# Patient Record
Sex: MALE | Race: WHITE | NOT HISPANIC OR LATINO | URBAN - METROPOLITAN AREA
[De-identification: names, ages, dates, MRNs, and addresses within clinical notes are randomized per-mention and may not be internally consistent; named-entity substitution may affect disease eponyms.]

---

## 2017-03-05 ENCOUNTER — EMERGENCY (EMERGENCY)
Facility: HOSPITAL | Age: 77
LOS: 1 days | Discharge: OTHER INSTITUTION | End: 2017-03-05
Attending: EMERGENCY MEDICINE | Admitting: EMERGENCY MEDICINE
Payer: MEDICARE

## 2017-03-05 VITALS
HEART RATE: 95 BPM | RESPIRATION RATE: 18 BRPM | OXYGEN SATURATION: 96 % | DIASTOLIC BLOOD PRESSURE: 78 MMHG | WEIGHT: 160.06 LBS | HEIGHT: 66 IN | SYSTOLIC BLOOD PRESSURE: 143 MMHG | TEMPERATURE: 99 F

## 2017-03-05 DIAGNOSIS — I10 ESSENTIAL (PRIMARY) HYPERTENSION: ICD-10-CM

## 2017-03-05 DIAGNOSIS — S30.1XXA CONTUSION OF ABDOMINAL WALL, INITIAL ENCOUNTER: ICD-10-CM

## 2017-03-05 DIAGNOSIS — R29.898 OTHER SYMPTOMS AND SIGNS INVOLVING THE MUSCULOSKELETAL SYSTEM: ICD-10-CM

## 2017-03-05 LAB
ALBUMIN SERPL ELPH-MCNC: 4.1 G/DL — SIGNIFICANT CHANGE UP (ref 3.4–5)
ALP SERPL-CCNC: 43 U/L — SIGNIFICANT CHANGE UP (ref 40–120)
ALT FLD-CCNC: 10 U/L — LOW (ref 12–42)
ANION GAP SERPL CALC-SCNC: 10 MMOL/L — SIGNIFICANT CHANGE UP (ref 9–16)
APPEARANCE UR: CLEAR — SIGNIFICANT CHANGE UP
APTT BLD: 30.8 SEC — SIGNIFICANT CHANGE UP (ref 27.5–36.5)
AST SERPL-CCNC: 21 U/L — SIGNIFICANT CHANGE UP (ref 15–37)
BASOPHILS NFR BLD AUTO: 0.4 % — SIGNIFICANT CHANGE UP (ref 0–2)
BILIRUB SERPL-MCNC: 1.1 MG/DL — SIGNIFICANT CHANGE UP (ref 0.2–1.2)
BILIRUB UR-MCNC: (no result)
BUN SERPL-MCNC: 18 MG/DL — SIGNIFICANT CHANGE UP (ref 7–23)
CALCIUM SERPL-MCNC: 9.3 MG/DL — SIGNIFICANT CHANGE UP (ref 8.5–10.5)
CHLORIDE SERPL-SCNC: 100 MMOL/L — SIGNIFICANT CHANGE UP (ref 96–108)
CK SERPL-CCNC: 170 U/L — SIGNIFICANT CHANGE UP (ref 39–308)
CO2 SERPL-SCNC: 24 MMOL/L — SIGNIFICANT CHANGE UP (ref 22–31)
COLOR SPEC: YELLOW — SIGNIFICANT CHANGE UP
CREAT SERPL-MCNC: 1.18 MG/DL — SIGNIFICANT CHANGE UP (ref 0.5–1.3)
DIFF PNL FLD: NEGATIVE — SIGNIFICANT CHANGE UP
EOSINOPHIL NFR BLD AUTO: 0.1 % — SIGNIFICANT CHANGE UP (ref 0–6)
ETHANOL SERPL-MCNC: <3 MG/DL — SIGNIFICANT CHANGE UP
GLUCOSE SERPL-MCNC: 107 MG/DL — HIGH (ref 70–99)
GLUCOSE UR QL: NEGATIVE — SIGNIFICANT CHANGE UP
HCT VFR BLD CALC: 45.3 % — SIGNIFICANT CHANGE UP (ref 39–50)
HGB BLD-MCNC: 16.3 G/DL — SIGNIFICANT CHANGE UP (ref 13–17)
IMM GRANULOCYTES NFR BLD AUTO: 0.6 % — SIGNIFICANT CHANGE UP (ref 0–1.5)
INR BLD: 1.16 — SIGNIFICANT CHANGE UP (ref 0.88–1.16)
KETONES UR-MCNC: 40 MG/DL
LACTATE SERPL-SCNC: 2.2 MMOL/L — HIGH (ref 0.4–2)
LEUKOCYTE ESTERASE UR-ACNC: NEGATIVE — SIGNIFICANT CHANGE UP
LYMPHOCYTES # BLD AUTO: 11.8 % — LOW (ref 13–44)
MCHC RBC-ENTMCNC: 32.6 PG — SIGNIFICANT CHANGE UP (ref 27–34)
MCHC RBC-ENTMCNC: 36 G/DL — SIGNIFICANT CHANGE UP (ref 32–36)
MCV RBC AUTO: 90.6 FL — SIGNIFICANT CHANGE UP (ref 80–100)
MONOCYTES NFR BLD AUTO: 8.5 % — SIGNIFICANT CHANGE UP (ref 2–14)
NEUTROPHILS NFR BLD AUTO: 78.6 % — HIGH (ref 43–77)
NITRITE UR-MCNC: NEGATIVE — SIGNIFICANT CHANGE UP
PCP SPEC-MCNC: SIGNIFICANT CHANGE UP
PH UR: 7 — SIGNIFICANT CHANGE UP (ref 4–8.1)
PLATELET # BLD AUTO: 202 K/UL — SIGNIFICANT CHANGE UP (ref 150–400)
POTASSIUM SERPL-MCNC: 4.7 MMOL/L — SIGNIFICANT CHANGE UP (ref 3.5–5.3)
POTASSIUM SERPL-SCNC: 4.7 MMOL/L — SIGNIFICANT CHANGE UP (ref 3.5–5.3)
PROT SERPL-MCNC: 7.4 G/DL — SIGNIFICANT CHANGE UP (ref 6.4–8.2)
PROT UR-MCNC: (no result) MG/DL
PROTHROM AB SERPL-ACNC: 12.8 SEC — SIGNIFICANT CHANGE UP (ref 10–13.1)
RBC # BLD: 5 M/UL — SIGNIFICANT CHANGE UP (ref 4.2–5.8)
RBC # FLD: 11.9 % — SIGNIFICANT CHANGE UP (ref 10.3–16.9)
SODIUM SERPL-SCNC: 134 MMOL/L — SIGNIFICANT CHANGE UP (ref 132–145)
SP GR SPEC: 1.02 — SIGNIFICANT CHANGE UP (ref 1–1.03)
UROBILINOGEN FLD QL: 0.2 E.U./DL — SIGNIFICANT CHANGE UP
WBC # BLD: 14.2 K/UL — HIGH (ref 3.8–10.5)
WBC # FLD AUTO: 14.2 K/UL — HIGH (ref 3.8–10.5)

## 2017-03-05 PROCEDURE — 99285 EMERGENCY DEPT VISIT HI MDM: CPT | Mod: 25

## 2017-03-05 PROCEDURE — 70450 CT HEAD/BRAIN W/O DYE: CPT | Mod: 26

## 2017-03-05 PROCEDURE — 93010 ELECTROCARDIOGRAM REPORT: CPT | Mod: 76

## 2017-03-05 RX ORDER — SODIUM CHLORIDE 9 MG/ML
1000 INJECTION INTRAMUSCULAR; INTRAVENOUS; SUBCUTANEOUS ONCE
Qty: 0 | Refills: 0 | Status: COMPLETED | OUTPATIENT
Start: 2017-03-05 | End: 2017-03-05

## 2017-03-05 RX ADMIN — SODIUM CHLORIDE 500 MILLILITER(S): 9 INJECTION INTRAMUSCULAR; INTRAVENOUS; SUBCUTANEOUS at 21:22

## 2017-03-05 NOTE — ED PROVIDER NOTE - OBJECTIVE STATEMENT
76 yom c/o "unable to move body and felt stiff" upon waking up.  pt states went to bed at 2pm, woke up feeling such.  no pain.  denies fall.  reports last EtOH use was yesterday (states 1 glass of vodka w/ orange juice) when he felt shaky.  denies any use today.  very poor historian, unable to give detailed medical history.

## 2017-03-05 NOTE — ED PROVIDER NOTE - MEDICAL DECISION MAKING DETAILS
"stiffness and unable to move", poor historian, in ED able to move all extremities but difficulty getting off of bed, no evidence of trauma, rectal temp 99.8, will check for source of infection, cpk, possible EtOH withdrawal (?), "stiffness and unable to move", poor historian, in ED able to move all extremities but difficulty getting off of bed, no evidence of trauma, rectal temp 99.8, will check for source of infection, cpk, possible EtOH withdrawal (?)

## 2017-03-05 NOTE — ED PROVIDER NOTE - PROGRESS NOTE DETAILS
spoke w/ friend, pt seen at [facility] Friday, for difficulty ambulating, declined admission, ongoing 1 mo of difficulty w/ ambulation, w/ frequent falls, uses a cane, normally patient does not need cane for ambulation.  Neurologist Dr. Reji Avila. ongoing sx x 1 year, low suspicion for acute cord compression, or epidural abscess (no spinal tenderness or fluctuance or erythema) added on sed rate and crp, possible rheumatologic manifestation? d/w [facility] transfer, [facility] medicine admission under Dr. Wadsworth Dr. Cuenca pt's private psychiatrist [phone] called and updates provided

## 2017-03-05 NOTE — ED ADULT TRIAGE NOTE - CHIEF COMPLAINT QUOTE
patient brought in by ambulance from apartment, complaining of "shaking" on waking up, also patient state being unable to move nor walk. As per EMS, patient regularly drinks ETOH and last drink was last night.

## 2017-03-05 NOTE — ED PROVIDER NOTE - PHYSICAL EXAMINATION
CON: awake, alert, disheveled, HENMT: clear oropharynx, soft neck, minimal tongue fasciculation, HEAD: atraumatic, CV: rrr, equal pulses b/l, RESP: cta b/l, GI: +BS, soft, nontender, no rebound, no guarding, SKIN: no rash, MSK: no edema noted, NEURO: moving all extremities spontaneously, strength 5/5 in all extremities, follows commands CON: awake, alert, disheveled, HENMT: clear oropharynx, soft neck, minimal tongue fasciculation, HEAD: atraumatic, CV: rrr, equal pulses b/l, RESP: cta b/l, GI: +BS, soft, nontender, no rebound, no guarding, SKIN: no rash, MSK: no edema noted, no spinal tenderness/fluctuance/erythema, L flank ecchymosis noted, NEURO: moving all extremities spontaneously, strength 5/5 in all extremities, follows commands

## 2017-03-06 VITALS
DIASTOLIC BLOOD PRESSURE: 89 MMHG | SYSTOLIC BLOOD PRESSURE: 181 MMHG | HEART RATE: 88 BPM | RESPIRATION RATE: 18 BRPM | OXYGEN SATURATION: 96 % | TEMPERATURE: 99 F

## 2017-03-06 LAB — CRP SERPL-MCNC: 1.14 MG/DL — HIGH (ref 0–0.4)

## 2017-03-06 RX ORDER — AMLODIPINE BESYLATE 2.5 MG/1
10 TABLET ORAL ONCE
Qty: 0 | Refills: 0 | Status: COMPLETED | OUTPATIENT
Start: 2017-03-06 | End: 2017-03-06

## 2017-03-06 RX ORDER — SODIUM CHLORIDE 9 MG/ML
1000 INJECTION INTRAMUSCULAR; INTRAVENOUS; SUBCUTANEOUS
Qty: 0 | Refills: 0 | Status: DISCONTINUED | OUTPATIENT
Start: 2017-03-06 | End: 2017-03-09

## 2017-03-06 RX ORDER — LABETALOL HCL 100 MG
10 TABLET ORAL ONCE
Qty: 0 | Refills: 0 | Status: COMPLETED | OUTPATIENT
Start: 2017-03-06 | End: 2017-03-06

## 2017-03-06 RX ADMIN — Medication 10 MILLIGRAM(S): at 03:40

## 2017-03-06 RX ADMIN — AMLODIPINE BESYLATE 10 MILLIGRAM(S): 2.5 TABLET ORAL at 03:40

## 2017-03-06 RX ADMIN — Medication 50 MILLIGRAM(S): at 17:10

## 2017-03-06 RX ADMIN — SODIUM CHLORIDE 60 MILLILITER(S): 9 INJECTION INTRAMUSCULAR; INTRAVENOUS; SUBCUTANEOUS at 19:41

## 2017-03-06 RX ADMIN — SODIUM CHLORIDE 60 MILLILITER(S): 9 INJECTION INTRAMUSCULAR; INTRAVENOUS; SUBCUTANEOUS at 12:34

## 2017-03-06 RX ADMIN — SODIUM CHLORIDE 60 MILLILITER(S): 9 INJECTION INTRAMUSCULAR; INTRAVENOUS; SUBCUTANEOUS at 08:27

## 2017-03-06 NOTE — ED ADULT NURSE REASSESSMENT NOTE - NS ED NURSE REASSESS COMMENT FT1
deep tissue injury noted to left lower back; MD smith notified; skin nonblanchable; pt denies pain to area; denies fall
as of 0700 called [facility] center and no beds available at this time

## 2017-03-07 LAB — ERYTHROCYTE [SEDIMENTATION RATE] IN BLOOD: 6 MM/HR — SIGNIFICANT CHANGE UP (ref 0–20)

## 2017-07-19 ENCOUNTER — EMERGENCY (EMERGENCY)
Facility: HOSPITAL | Age: 77
LOS: 1 days | Discharge: HOME | End: 2017-07-19
Attending: EMERGENCY MEDICINE | Admitting: PSYCHIATRY & NEUROLOGY
Payer: MEDICARE

## 2017-07-19 VITALS
WEIGHT: 149.91 LBS | SYSTOLIC BLOOD PRESSURE: 152 MMHG | OXYGEN SATURATION: 95 % | HEART RATE: 62 BPM | DIASTOLIC BLOOD PRESSURE: 81 MMHG | TEMPERATURE: 99 F | RESPIRATION RATE: 16 BRPM

## 2017-07-19 VITALS
HEART RATE: 70 BPM | TEMPERATURE: 98 F | DIASTOLIC BLOOD PRESSURE: 78 MMHG | SYSTOLIC BLOOD PRESSURE: 145 MMHG | RESPIRATION RATE: 16 BRPM | OXYGEN SATURATION: 96 %

## 2017-07-19 DIAGNOSIS — S63.283A DISLOCATION OF PROXIMAL INTERPHALANGEAL JOINT OF LEFT MIDDLE FINGER, INITIAL ENCOUNTER: ICD-10-CM

## 2017-07-19 DIAGNOSIS — Z79.891 LONG TERM (CURRENT) USE OF OPIATE ANALGESIC: ICD-10-CM

## 2017-07-19 DIAGNOSIS — F17.200 NICOTINE DEPENDENCE, UNSPECIFIED, UNCOMPLICATED: ICD-10-CM

## 2017-07-19 DIAGNOSIS — I10 ESSENTIAL (PRIMARY) HYPERTENSION: ICD-10-CM

## 2017-07-19 PROCEDURE — 73130 X-RAY EXAM OF HAND: CPT | Mod: 26,LT

## 2017-07-19 PROCEDURE — 99284 EMERGENCY DEPT VISIT MOD MDM: CPT | Mod: 25

## 2017-07-19 PROCEDURE — 26770 TREAT FINGER DISLOCATION: CPT | Mod: 54,LT

## 2017-07-19 RX ORDER — ACETAMINOPHEN 500 MG
975 TABLET ORAL ONCE
Qty: 0 | Refills: 0 | Status: COMPLETED | OUTPATIENT
Start: 2017-07-19 | End: 2017-07-19

## 2017-07-19 RX ADMIN — Medication 975 MILLIGRAM(S): at 21:13

## 2017-07-19 NOTE — ED PROVIDER NOTE - SKIN, MLM
Skin normal color for race, warm, dry and intact. Extensive stains from cigs on hands and face. Sa bleeding at 4rd fingertip at tip (abrasiona0 and chipped nail. Skin normal color for race, warm, dry and intact. Extensive stains from cigs on hands and face. Sa bleeding at 5th fingertip at tip (abrasiona0 and chipped nail.

## 2017-07-19 NOTE — ED PROVIDER NOTE - OBJECTIVE STATEMENT
77 M here with [facility] fall- uses cane at baseline. Broke or dislocated L 3rd digit. No other complaints. No other injuries. Didn't take anything for it. Accompanied by his partner.

## 2017-07-19 NOTE — ED PROVIDER NOTE - CARE PLAN
Principal Discharge DX:	Finger injury, left, initial encounter Principal Discharge DX:	Finger injury, left, initial encounter  Secondary Diagnosis:	Finger dislocation, initial encounter

## 2017-07-19 NOTE — ED PROVIDER NOTE - ENMT, MLM
Airway patent, Nasal mucosa clear. MMM, Fac stained from smoking cigs. Airway patent, Nasal mucosa clear. MMM, Face stained from smoking cigs.

## 2017-07-19 NOTE — ED PROVIDER NOTE - MUSCULOSKELETAL, MLM
Spine appears normal, range of motion is not limited, + L 3rd digit angulated ulnarly at DIP. n/v intact Spine appears normal, range of motion is not limited, + L 4th digit angulated ulnarly at DIP. n/v intact

## 2017-07-19 NOTE — ED PROVIDER NOTE - DIAGNOSTIC INTERPRETATION
Interpreted by ED Physician:  Hand xray (3 view)- no fx, no jt effusion, no soft tissue swelling, + dislocation of 4th digit at PIP

## 2017-07-19 NOTE — ED ADULT TRIAGE NOTE - CHIEF COMPLAINT QUOTE
Pt reports trip and fall on the sidewalk while walking. Deformity noted to left 4th finger. Denies any other injuries.

## 2017-07-20 NOTE — ED PROCEDURE NOTE - CPROC ED TIME OUT STATEMENT1
“Patient's name, , procedure and correct site were confirmed during the [place] Timeout.”
“Patient's name, , procedure and correct site were confirmed during the [place] Timeout.”

## 2017-07-20 NOTE — ED PROCEDURE NOTE - CPROC ED POST PROC CARE GUIDE1
Verbal/written post procedure instructions were given to patient/caregiver./Instructed patient/caregiver regarding signs and symptoms of infection./Instructed patient/caregiver to follow-up with primary care physician.
Keep the cast/splint/dressing clean and dry./Elevate the injured extremity as instructed./Verbal/written post procedure instructions were given to patient/caregiver./Instructed patient/caregiver to follow-up with primary care physician.

## 2018-01-25 NOTE — ED ADULT TRIAGE NOTE - TEMPERATURE IN FAHRENHEIT (DEGREES F)
Short Stay Endoscopy History and Physical    PCP - Nancy Pugh MD    Procedure - Colonoscopy  ASA - 2  Mallampati - per anesthesia  History of Anesthesia problems - no  Family history Anesthesia problems -  no     HPI:  This is a 52 y.o. male here for evaluation of :   Active Hospital Problems    Diagnosis  POA    *Screening for colon cancer [Z12.11]  Not Applicable    Special screening for malignant neoplasms, colon [Z12.11]  Not Applicable      Resolved Hospital Problems    Diagnosis Date Resolved POA   No resolved problems to display.         Health Maintenance       Date Due Completion Date    Colonoscopy 09/08/2014 ---    Lipid Panel 01/06/2019 1/6/2018    TETANUS VACCINE 07/21/2026 7/21/2016          Screening - yes  History of polyps - no  Diarrhea - no  Anemia - no  Blood in stools - no  Abdominal pain - no  Other - no    ROS:  CONSTITUTIONAL: Denies weight change,  fatigue, fevers, chills, night sweats.  CARDIOVASCULAR: Denies chest pain, shortness of breath, orthopnea and edema.  RESPIRATORY: Denies cough, hemoptysis, dyspnea, and wheezing.  GI: See HPI.    Medical History:   Past Medical History:   Diagnosis Date    Hyperlipidemia     Hypertension        Surgical History:   History reviewed. No pertinent surgical history.    Family History:   Family History   Problem Relation Age of Onset    Heart disease Father     Heart attacks under age 50 Father     Diabetes Son 12    Diabetes Paternal Grandmother        Social History:   Social History   Substance Use Topics    Smoking status: Former Smoker     Packs/day: 0.75     Years: 13.00     Quit date: 7/24/2015    Smokeless tobacco: Never Used    Alcohol use No       Allergies:   Review of patient's allergies indicates:  No Known Allergies    Medications:   No current facility-administered medications on file prior to encounter.      Current Outpatient Prescriptions on File Prior to Encounter   Medication Sig Dispense Refill    fluticasone  (FLONASE) 50 mcg/actuation nasal spray USE TWO SPRAY(S) IN EACH NOSTRIL ONCE DAILY 16 g 11    hydroCHLOROthiazide (MICROZIDE) 12.5 mg capsule Take 1 capsule (12.5 mg total) by mouth once daily. 90 capsule 0    metoprolol succinate (TOPROL-XL) 100 MG 24 hr tablet Take 1 tablet (100 mg total) by mouth once daily. 90 tablet 0    pravastatin (PRAVACHOL) 20 MG tablet Take 1 tablet (20 mg total) by mouth once daily. 90 tablet 3    loratadine (CLARITIN) 10 mg tablet Take 1 tablet (10 mg total) by mouth once daily.  0    omega-3 fatty acids-vitamin E (FISH OIL) 1,000 mg Cap Take 1 capsule by mouth 2 (two) times daily.  0       Physical Exam:  Vital Signs:   Vitals:    01/25/18 0738   BP: 137/67   Pulse: 94   Resp: 20   Temp: 98.6 °F (37 °C)     General Appearance: Well appearing in no acute distress  ENT: OP clear  Chest: CTA B  CV: RRR, no m/r/g  Abd: s/nt/nd/nabs  Ext: no edema    Labs:Reviewed    IMP:  Active Hospital Problems    Diagnosis  POA    *Screening for colon cancer [Z12.11]  Not Applicable    Special screening for malignant neoplasms, colon [Z12.11]  Not Applicable      Resolved Hospital Problems    Diagnosis Date Resolved POA   No resolved problems to display.         Plan:   I have explained the risks and benefits of colonoscopy to the patient including but not limited to bleeding, perforation, infection, and death. The patient wishes to proceed.     98.9
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
## 2018-10-19 ENCOUNTER — INPATIENT (INPATIENT)
Facility: HOSPITAL | Age: 78
LOS: 6 days | Discharge: HOME | DRG: 682 | End: 2018-10-26
Attending: INTERNAL MEDICINE | Admitting: INTERNAL MEDICINE
Payer: MEDICARE

## 2018-10-19 VITALS
OXYGEN SATURATION: 97 % | TEMPERATURE: 98 F | HEART RATE: 61 BPM | DIASTOLIC BLOOD PRESSURE: 63 MMHG | RESPIRATION RATE: 18 BRPM | SYSTOLIC BLOOD PRESSURE: 123 MMHG

## 2018-10-19 LAB
ALBUMIN SERPL ELPH-MCNC: 3.7 G/DL — SIGNIFICANT CHANGE UP (ref 3.4–5)
ALBUMIN SERPL ELPH-MCNC: 4 G/DL — SIGNIFICANT CHANGE UP (ref 3.4–5)
ALP SERPL-CCNC: 77 U/L — SIGNIFICANT CHANGE UP (ref 40–120)
ALP SERPL-CCNC: 88 U/L — SIGNIFICANT CHANGE UP (ref 40–120)
ALT FLD-CCNC: 13 U/L — SIGNIFICANT CHANGE UP (ref 12–42)
ALT FLD-CCNC: 15 U/L — SIGNIFICANT CHANGE UP (ref 12–42)
ANION GAP SERPL CALC-SCNC: 16 MMOL/L — SIGNIFICANT CHANGE UP (ref 9–16)
ANION GAP SERPL CALC-SCNC: 17 MMOL/L — HIGH (ref 9–16)
APAP SERPL-MCNC: <2 UG/ML — LOW (ref 10–30)
APPEARANCE UR: CLEAR — SIGNIFICANT CHANGE UP
APTT BLD: 37.5 SEC — HIGH (ref 27.5–36.5)
AST SERPL-CCNC: 26 U/L — SIGNIFICANT CHANGE UP (ref 15–37)
AST SERPL-CCNC: 27 U/L — SIGNIFICANT CHANGE UP (ref 15–37)
BASOPHILS NFR BLD AUTO: 0.4 % — SIGNIFICANT CHANGE UP (ref 0–2)
BASOPHILS NFR BLD AUTO: 0.6 % — SIGNIFICANT CHANGE UP (ref 0–2)
BILIRUB SERPL-MCNC: 0.6 MG/DL — SIGNIFICANT CHANGE UP (ref 0.2–1.2)
BILIRUB SERPL-MCNC: 0.6 MG/DL — SIGNIFICANT CHANGE UP (ref 0.2–1.2)
BILIRUB UR-MCNC: ABNORMAL
BUN SERPL-MCNC: 41 MG/DL — HIGH (ref 7–23)
BUN SERPL-MCNC: 41 MG/DL — HIGH (ref 7–23)
CALCIUM SERPL-MCNC: 8.6 MG/DL — SIGNIFICANT CHANGE UP (ref 8.5–10.5)
CALCIUM SERPL-MCNC: 8.8 MG/DL — SIGNIFICANT CHANGE UP (ref 8.5–10.5)
CHLORIDE SERPL-SCNC: 96 MMOL/L — SIGNIFICANT CHANGE UP (ref 96–108)
CHLORIDE SERPL-SCNC: 99 MMOL/L — SIGNIFICANT CHANGE UP (ref 96–108)
CO2 SERPL-SCNC: 16 MMOL/L — LOW (ref 22–31)
CO2 SERPL-SCNC: 17 MMOL/L — LOW (ref 22–31)
COLOR SPEC: YELLOW — SIGNIFICANT CHANGE UP
CREAT SERPL-MCNC: 2.53 MG/DL — HIGH (ref 0.5–1.3)
CREAT SERPL-MCNC: 2.82 MG/DL — HIGH (ref 0.5–1.3)
DIFF PNL FLD: ABNORMAL
EOSINOPHIL NFR BLD AUTO: 0.2 % — SIGNIFICANT CHANGE UP (ref 0–6)
EOSINOPHIL NFR BLD AUTO: 0.5 % — SIGNIFICANT CHANGE UP (ref 0–6)
ETHANOL SERPL-MCNC: <3 MG/DL — SIGNIFICANT CHANGE UP
GLUCOSE SERPL-MCNC: 111 MG/DL — HIGH (ref 70–99)
GLUCOSE SERPL-MCNC: 156 MG/DL — HIGH (ref 70–99)
GLUCOSE UR QL: NEGATIVE — SIGNIFICANT CHANGE UP
HCT VFR BLD CALC: 43.1 % — SIGNIFICANT CHANGE UP (ref 39–50)
HCT VFR BLD CALC: 45.2 % — SIGNIFICANT CHANGE UP (ref 39–50)
HGB BLD-MCNC: 15.9 G/DL — SIGNIFICANT CHANGE UP (ref 13–17)
HGB BLD-MCNC: 16.9 G/DL — SIGNIFICANT CHANGE UP (ref 13–17)
IMM GRANULOCYTES NFR BLD AUTO: 0.4 % — SIGNIFICANT CHANGE UP (ref 0–1.5)
IMM GRANULOCYTES NFR BLD AUTO: 0.5 % — SIGNIFICANT CHANGE UP (ref 0–1.5)
INR BLD: 1.2 — HIGH (ref 0.88–1.16)
KETONES UR-MCNC: 40 MG/DL
LACTATE SERPL-SCNC: 1 MMOL/L — SIGNIFICANT CHANGE UP (ref 0.4–2)
LACTATE SERPL-SCNC: 2.1 MMOL/L — HIGH (ref 0.4–2)
LEUKOCYTE ESTERASE UR-ACNC: NEGATIVE — SIGNIFICANT CHANGE UP
LYMPHOCYTES # BLD AUTO: 15.6 % — SIGNIFICANT CHANGE UP (ref 13–44)
LYMPHOCYTES # BLD AUTO: 17 % — SIGNIFICANT CHANGE UP (ref 13–44)
MAGNESIUM SERPL-MCNC: 2 MG/DL — SIGNIFICANT CHANGE UP (ref 1.6–2.6)
MCHC RBC-ENTMCNC: 31.9 PG — SIGNIFICANT CHANGE UP (ref 27–34)
MCHC RBC-ENTMCNC: 32.3 PG — SIGNIFICANT CHANGE UP (ref 27–34)
MCHC RBC-ENTMCNC: 36.9 G/DL — HIGH (ref 32–36)
MCHC RBC-ENTMCNC: 37.4 G/DL — HIGH (ref 32–36)
MCV RBC AUTO: 86.4 FL — SIGNIFICANT CHANGE UP (ref 80–100)
MCV RBC AUTO: 86.5 FL — SIGNIFICANT CHANGE UP (ref 80–100)
MONOCYTES NFR BLD AUTO: 7 % — SIGNIFICANT CHANGE UP (ref 2–14)
MONOCYTES NFR BLD AUTO: 7.4 % — SIGNIFICANT CHANGE UP (ref 2–14)
NEUTROPHILS NFR BLD AUTO: 74.9 % — SIGNIFICANT CHANGE UP (ref 43–77)
NEUTROPHILS NFR BLD AUTO: 75.5 % — SIGNIFICANT CHANGE UP (ref 43–77)
NITRITE UR-MCNC: NEGATIVE — SIGNIFICANT CHANGE UP
PCP SPEC-MCNC: SIGNIFICANT CHANGE UP
PH UR: 5.5 — SIGNIFICANT CHANGE UP (ref 5–8)
PHOSPHATE SERPL-MCNC: 3.4 MG/DL — SIGNIFICANT CHANGE UP (ref 2.5–4.5)
PLATELET # BLD AUTO: 221 K/UL — SIGNIFICANT CHANGE UP (ref 150–400)
PLATELET # BLD AUTO: 265 K/UL — SIGNIFICANT CHANGE UP (ref 150–400)
POTASSIUM SERPL-MCNC: 2.2 MMOL/L — CRITICAL LOW (ref 3.5–5.3)
POTASSIUM SERPL-MCNC: 2.6 MMOL/L — CRITICAL LOW (ref 3.5–5.3)
POTASSIUM SERPL-SCNC: 2.2 MMOL/L — CRITICAL LOW (ref 3.5–5.3)
POTASSIUM SERPL-SCNC: 2.6 MMOL/L — CRITICAL LOW (ref 3.5–5.3)
PROT SERPL-MCNC: 7.2 G/DL — SIGNIFICANT CHANGE UP (ref 6.4–8.2)
PROT SERPL-MCNC: 7.9 G/DL — SIGNIFICANT CHANGE UP (ref 6.4–8.2)
PROT UR-MCNC: 30 MG/DL
PROTHROM AB SERPL-ACNC: 13.3 SEC — HIGH (ref 9.8–12.7)
RBC # BLD: 4.98 M/UL — SIGNIFICANT CHANGE UP (ref 4.2–5.8)
RBC # BLD: 5.23 M/UL — SIGNIFICANT CHANGE UP (ref 4.2–5.8)
RBC # FLD: 12.3 % — SIGNIFICANT CHANGE UP (ref 10.3–14.5)
RBC # FLD: 12.4 % — SIGNIFICANT CHANGE UP (ref 10.3–14.5)
SALICYLATES SERPL-MCNC: 5.5 MG/DL — SIGNIFICANT CHANGE UP (ref 2.8–20)
SODIUM SERPL-SCNC: 130 MMOL/L — LOW (ref 132–145)
SODIUM SERPL-SCNC: 131 MMOL/L — LOW (ref 132–145)
SP GR SPEC: 1.02 — SIGNIFICANT CHANGE UP (ref 1–1.03)
TROPONIN I SERPL-MCNC: 0.1 NG/ML — HIGH (ref 0.02–0.06)
TROPONIN I SERPL-MCNC: 0.12 NG/ML — HIGH (ref 0.02–0.06)
TSH SERPL-MCNC: 0.64 UIU/ML — SIGNIFICANT CHANGE UP (ref 0.36–3.74)
UROBILINOGEN FLD QL: 1 E.U./DL — SIGNIFICANT CHANGE UP
WBC # BLD: 12.3 K/UL — HIGH (ref 3.8–10.5)
WBC # BLD: 14.5 K/UL — HIGH (ref 3.8–10.5)
WBC # FLD AUTO: 12.3 K/UL — HIGH (ref 3.8–10.5)
WBC # FLD AUTO: 14.5 K/UL — HIGH (ref 3.8–10.5)

## 2018-10-19 PROCEDURE — 93010 ELECTROCARDIOGRAM REPORT: CPT

## 2018-10-19 PROCEDURE — 99284 EMERGENCY DEPT VISIT MOD MDM: CPT | Mod: 25

## 2018-10-19 PROCEDURE — 70450 CT HEAD/BRAIN W/O DYE: CPT | Mod: 26

## 2018-10-19 PROCEDURE — 71046 X-RAY EXAM CHEST 2 VIEWS: CPT | Mod: 26

## 2018-10-19 RX ORDER — MAGNESIUM SULFATE 500 MG/ML
2 VIAL (ML) INJECTION ONCE
Qty: 0 | Refills: 0 | Status: DISCONTINUED | OUTPATIENT
Start: 2018-10-19 | End: 2018-10-19

## 2018-10-19 RX ORDER — POTASSIUM CHLORIDE 20 MEQ
40 PACKET (EA) ORAL ONCE
Qty: 0 | Refills: 0 | Status: COMPLETED | OUTPATIENT
Start: 2018-10-19 | End: 2018-10-19

## 2018-10-19 RX ORDER — SODIUM CHLORIDE 9 MG/ML
1000 INJECTION INTRAMUSCULAR; INTRAVENOUS; SUBCUTANEOUS ONCE
Qty: 0 | Refills: 0 | Status: COMPLETED | OUTPATIENT
Start: 2018-10-19 | End: 2018-10-19

## 2018-10-19 RX ORDER — POTASSIUM CHLORIDE 20 MEQ
10 PACKET (EA) ORAL ONCE
Qty: 0 | Refills: 0 | Status: COMPLETED | OUTPATIENT
Start: 2018-10-19 | End: 2018-10-19

## 2018-10-19 RX ORDER — ASPIRIN/CALCIUM CARB/MAGNESIUM 324 MG
324 TABLET ORAL ONCE
Qty: 0 | Refills: 0 | Status: COMPLETED | OUTPATIENT
Start: 2018-10-19 | End: 2018-10-19

## 2018-10-19 RX ADMIN — Medication 100 MILLIEQUIVALENT(S): at 23:28

## 2018-10-19 RX ADMIN — Medication 10 MILLIEQUIVALENT(S): at 18:58

## 2018-10-19 RX ADMIN — Medication 100 MILLIEQUIVALENT(S): at 19:02

## 2018-10-19 RX ADMIN — SODIUM CHLORIDE 1000 MILLILITER(S): 9 INJECTION INTRAMUSCULAR; INTRAVENOUS; SUBCUTANEOUS at 18:02

## 2018-10-19 RX ADMIN — Medication 324 MILLIGRAM(S): at 18:04

## 2018-10-19 RX ADMIN — SODIUM CHLORIDE 1000 MILLILITER(S): 9 INJECTION INTRAMUSCULAR; INTRAVENOUS; SUBCUTANEOUS at 23:29

## 2018-10-19 RX ADMIN — Medication 40 MILLIEQUIVALENT(S): at 23:28

## 2018-10-19 RX ADMIN — SODIUM CHLORIDE 1000 MILLILITER(S): 9 INJECTION INTRAMUSCULAR; INTRAVENOUS; SUBCUTANEOUS at 19:02

## 2018-10-19 RX ADMIN — Medication 10 MILLIEQUIVALENT(S): at 20:24

## 2018-10-19 RX ADMIN — Medication 100 MILLIEQUIVALENT(S): at 17:58

## 2018-10-19 RX ADMIN — Medication 40 MILLIEQUIVALENT(S): at 17:58

## 2018-10-19 NOTE — ED PROVIDER NOTE - MEDICAL DECISION MAKING DETAILS
Case was discussed to Dr. Flores ([facility] Intensivist) who recommends [facility]. Case endorsed to Dr. Robbins ([facility] Hospitalist) and [facility], and pt has been accepted to [facility] under Dr. Ventura.

## 2018-10-19 NOTE — ED PROVIDER NOTE - OBJECTIVE STATEMENT
79 y/o M with PMH of HTN, Anxiety and Depression BIBA for worsening weakness x 3 weeks. His accompanying friend states that the patient has become increasingly weak over the past 3 weeks. He states the patient sleeps throughout most of the day and has been less responsive recently. He has also noticed that the patient has a decreased appetite. The patient called him this morning and asked him to bring him to his ophthalmologist appointment and while the patient was being examined he began leaning to one side (unknown laterality) so the Ophthalmologist called 911 and EMS arrived. EMT states that they arrived along with the "[facility] Mobile Stroke Unit" and an acute stroke was ruled out by the team, however the patient was then transported here for further evaluation. The patient is lethargic however responds to verbal stimuli. When asked what brings him here, he states he feels increasingly weak and tired recently but denies having any other associated sx's when asked. He admits ot taking Xanax prior to his Ophthlmology appointment today however denies any other meds or drug use when asked.    Denies fever, chills, headache, dizziness, CP, SOB, palpitations, abdo pain, N/V/D

## 2018-10-19 NOTE — ED ADULT TRIAGE NOTE - CHIEF COMPLAINT QUOTE
Per EMS, patient sent from eye doctor's office due to weakness. Stroke team was called while patient was at the doctor's and stroke code canceled. on arrival, patient is lethargic, not answering questions. Noted pale and dry. Friend with patient. Clinically upgraded and taken to room 7.

## 2018-10-19 NOTE — ED ADULT NURSE NOTE - NSIMPLEMENTINTERV_GEN_ALL_ED
Implemented All Universal Safety Interventions:  [place] to call system. Call bell, personal items and telephone within reach. Instruct patient to call for assistance. Room bathroom lighting operational. Non-slip footwear when patient is off stretcher. Physically safe environment: no spills, clutter or unnecessary equipment. Stretcher in lowest position, wheels locked, appropriate side rails in place.

## 2018-10-19 NOTE — ED ADULT NURSE NOTE - OBJECTIVE STATEMENT
patient 77 YO Male with unknown medical hx c/o weakness for more than 1 day . patient was found by friend. patient does admit to chronic drinking. patient aaox3,

## 2018-10-20 DIAGNOSIS — N17.9 ACUTE KIDNEY FAILURE, UNSPECIFIED: ICD-10-CM

## 2018-10-20 DIAGNOSIS — E87.1 HYPO-OSMOLALITY AND HYPONATREMIA: ICD-10-CM

## 2018-10-20 DIAGNOSIS — F32.9 MAJOR DEPRESSIVE DISORDER, SINGLE EPISODE, UNSPECIFIED: ICD-10-CM

## 2018-10-20 DIAGNOSIS — G92 TOXIC ENCEPHALOPATHY: ICD-10-CM

## 2018-10-20 DIAGNOSIS — I10 ESSENTIAL (PRIMARY) HYPERTENSION: ICD-10-CM

## 2018-10-20 DIAGNOSIS — Z29.9 ENCOUNTER FOR PROPHYLACTIC MEASURES, UNSPECIFIED: ICD-10-CM

## 2018-10-20 DIAGNOSIS — Z91.89 OTHER SPECIFIED PERSONAL RISK FACTORS, NOT ELSEWHERE CLASSIFIED: ICD-10-CM

## 2018-10-20 DIAGNOSIS — R74.8 ABNORMAL LEVELS OF OTHER SERUM ENZYMES: ICD-10-CM

## 2018-10-20 DIAGNOSIS — E87.6 HYPOKALEMIA: ICD-10-CM

## 2018-10-20 DIAGNOSIS — R63.8 OTHER SYMPTOMS AND SIGNS CONCERNING FOOD AND FLUID INTAKE: ICD-10-CM

## 2018-10-20 LAB
-  COAGULASE NEGATIVE STAPHYLOCOCCUS: SIGNIFICANT CHANGE UP
ANION GAP SERPL CALC-SCNC: 15 MMOL/L — SIGNIFICANT CHANGE UP (ref 5–17)
ANION GAP SERPL CALC-SCNC: 17 MMOL/L — SIGNIFICANT CHANGE UP (ref 5–17)
ANION GAP SERPL CALC-SCNC: 17 MMOL/L — SIGNIFICANT CHANGE UP (ref 5–17)
ANION GAP SERPL CALC-SCNC: 18 MMOL/L — HIGH (ref 5–17)
ANION GAP SERPL CALC-SCNC: 19 MMOL/L — HIGH (ref 5–17)
APTT BLD: 29 SEC — SIGNIFICANT CHANGE UP (ref 27.5–37.4)
APTT BLD: 37.6 SEC — HIGH (ref 27.5–37.4)
B-OH-BUTYR SERPL-SCNC: 2.2 MMOL/L — HIGH
BUN SERPL-MCNC: 29 MG/DL — HIGH (ref 7–23)
BUN SERPL-MCNC: 33 MG/DL — HIGH (ref 7–23)
BUN SERPL-MCNC: 35 MG/DL — HIGH (ref 7–23)
BUN SERPL-MCNC: 37 MG/DL — HIGH (ref 7–23)
BUN SERPL-MCNC: 37 MG/DL — HIGH (ref 7–23)
CALCIUM SERPL-MCNC: 8.5 MG/DL — SIGNIFICANT CHANGE UP (ref 8.4–10.5)
CALCIUM SERPL-MCNC: 8.6 MG/DL — SIGNIFICANT CHANGE UP (ref 8.4–10.5)
CALCIUM SERPL-MCNC: 8.6 MG/DL — SIGNIFICANT CHANGE UP (ref 8.4–10.5)
CALCIUM SERPL-MCNC: 8.8 MG/DL — SIGNIFICANT CHANGE UP (ref 8.4–10.5)
CALCIUM SERPL-MCNC: 9 MG/DL — SIGNIFICANT CHANGE UP (ref 8.4–10.5)
CHLORIDE SERPL-SCNC: 101 MMOL/L — SIGNIFICANT CHANGE UP (ref 96–108)
CHLORIDE SERPL-SCNC: 102 MMOL/L — SIGNIFICANT CHANGE UP (ref 96–108)
CHLORIDE SERPL-SCNC: 102 MMOL/L — SIGNIFICANT CHANGE UP (ref 96–108)
CHLORIDE SERPL-SCNC: 95 MMOL/L — LOW (ref 96–108)
CHLORIDE SERPL-SCNC: 99 MMOL/L — SIGNIFICANT CHANGE UP (ref 96–108)
CO2 SERPL-SCNC: 12 MMOL/L — LOW (ref 22–31)
CO2 SERPL-SCNC: 13 MMOL/L — LOW (ref 22–31)
CO2 SERPL-SCNC: 13 MMOL/L — LOW (ref 22–31)
CO2 SERPL-SCNC: 16 MMOL/L — LOW (ref 22–31)
CO2 SERPL-SCNC: 17 MMOL/L — LOW (ref 22–31)
CREAT SERPL-MCNC: 1.63 MG/DL — HIGH (ref 0.5–1.3)
CREAT SERPL-MCNC: 1.74 MG/DL — HIGH (ref 0.5–1.3)
CREAT SERPL-MCNC: 1.89 MG/DL — HIGH (ref 0.5–1.3)
CREAT SERPL-MCNC: 2.13 MG/DL — HIGH (ref 0.5–1.3)
CREAT SERPL-MCNC: 2.17 MG/DL — HIGH (ref 0.5–1.3)
CULTURE RESULTS: NO GROWTH — SIGNIFICANT CHANGE UP
GLUCOSE SERPL-MCNC: 131 MG/DL — HIGH (ref 70–99)
GLUCOSE SERPL-MCNC: 144 MG/DL — HIGH (ref 70–99)
GLUCOSE SERPL-MCNC: 156 MG/DL — HIGH (ref 70–99)
GLUCOSE SERPL-MCNC: 186 MG/DL — HIGH (ref 70–99)
GLUCOSE SERPL-MCNC: 189 MG/DL — HIGH (ref 70–99)
GRAM STN FLD: SIGNIFICANT CHANGE UP
HBA1C BLD-MCNC: 5.2 % — SIGNIFICANT CHANGE UP (ref 4–5.6)
HCT VFR BLD CALC: 38.7 % — LOW (ref 39–50)
HCT VFR BLD CALC: 42.1 % — SIGNIFICANT CHANGE UP (ref 39–50)
HGB BLD-MCNC: 14.4 G/DL — SIGNIFICANT CHANGE UP (ref 13–17)
HGB BLD-MCNC: 15.7 G/DL — SIGNIFICANT CHANGE UP (ref 13–17)
INR BLD: 1.18 — HIGH (ref 0.88–1.16)
INR BLD: 1.2 — HIGH (ref 0.88–1.16)
LACTATE SERPL-SCNC: 1.7 MMOL/L — SIGNIFICANT CHANGE UP (ref 0.5–2)
MAGNESIUM SERPL-MCNC: 1.8 MG/DL — SIGNIFICANT CHANGE UP (ref 1.6–2.6)
MAGNESIUM SERPL-MCNC: 2 MG/DL — SIGNIFICANT CHANGE UP (ref 1.6–2.6)
MAGNESIUM SERPL-MCNC: 2 MG/DL — SIGNIFICANT CHANGE UP (ref 1.6–2.6)
MAGNESIUM SERPL-MCNC: 2.1 MG/DL — SIGNIFICANT CHANGE UP (ref 1.6–2.6)
MAGNESIUM SERPL-MCNC: 2.1 MG/DL — SIGNIFICANT CHANGE UP (ref 1.6–2.6)
MCHC RBC-ENTMCNC: 32 PG — SIGNIFICANT CHANGE UP (ref 27–34)
MCHC RBC-ENTMCNC: 32.1 PG — SIGNIFICANT CHANGE UP (ref 27–34)
MCHC RBC-ENTMCNC: 37.2 G/DL — HIGH (ref 32–36)
MCHC RBC-ENTMCNC: 37.3 G/DL — HIGH (ref 32–36)
MCV RBC AUTO: 85.9 FL — SIGNIFICANT CHANGE UP (ref 80–100)
MCV RBC AUTO: 86.4 FL — SIGNIFICANT CHANGE UP (ref 80–100)
METHOD TYPE: SIGNIFICANT CHANGE UP
OSMOLALITY SERPL: 288 MOSM/KG — SIGNIFICANT CHANGE UP (ref 280–301)
PHOSPHATE SERPL-MCNC: 1.3 MG/DL — LOW (ref 2.5–4.5)
PHOSPHATE SERPL-MCNC: 1.5 MG/DL — LOW (ref 2.5–4.5)
PLATELET # BLD AUTO: 189 K/UL — SIGNIFICANT CHANGE UP (ref 150–400)
PLATELET # BLD AUTO: 207 K/UL — SIGNIFICANT CHANGE UP (ref 150–400)
POTASSIUM SERPL-MCNC: 2.8 MMOL/L — CRITICAL LOW (ref 3.5–5.3)
POTASSIUM SERPL-MCNC: 2.9 MMOL/L — CRITICAL LOW (ref 3.5–5.3)
POTASSIUM SERPL-MCNC: 3.3 MMOL/L — LOW (ref 3.5–5.3)
POTASSIUM SERPL-MCNC: 3.8 MMOL/L — SIGNIFICANT CHANGE UP (ref 3.5–5.3)
POTASSIUM SERPL-MCNC: 3.9 MMOL/L — SIGNIFICANT CHANGE UP (ref 3.5–5.3)
POTASSIUM SERPL-SCNC: 2.8 MMOL/L — CRITICAL LOW (ref 3.5–5.3)
POTASSIUM SERPL-SCNC: 2.9 MMOL/L — CRITICAL LOW (ref 3.5–5.3)
POTASSIUM SERPL-SCNC: 3.3 MMOL/L — LOW (ref 3.5–5.3)
POTASSIUM SERPL-SCNC: 3.8 MMOL/L — SIGNIFICANT CHANGE UP (ref 3.5–5.3)
POTASSIUM SERPL-SCNC: 3.9 MMOL/L — SIGNIFICANT CHANGE UP (ref 3.5–5.3)
PROTHROM AB SERPL-ACNC: 13.1 SEC — HIGH (ref 9.8–12.7)
PROTHROM AB SERPL-ACNC: 13.4 SEC — HIGH (ref 9.8–12.7)
RBC # BLD: 4.48 M/UL — SIGNIFICANT CHANGE UP (ref 4.2–5.8)
RBC # BLD: 4.9 M/UL — SIGNIFICANT CHANGE UP (ref 4.2–5.8)
RBC # FLD: 12.9 % — SIGNIFICANT CHANGE UP (ref 10.3–16.9)
RBC # FLD: 12.9 % — SIGNIFICANT CHANGE UP (ref 10.3–16.9)
SODIUM SERPL-SCNC: 130 MMOL/L — LOW (ref 135–145)
SODIUM SERPL-SCNC: 131 MMOL/L — LOW (ref 135–145)
SODIUM SERPL-SCNC: 131 MMOL/L — LOW (ref 135–145)
SODIUM SERPL-SCNC: 132 MMOL/L — LOW (ref 135–145)
SODIUM SERPL-SCNC: 132 MMOL/L — LOW (ref 135–145)
SPECIMEN SOURCE: SIGNIFICANT CHANGE UP
SPECIMEN SOURCE: SIGNIFICANT CHANGE UP
T4 AB SER-ACNC: 7.4 UG/DL — SIGNIFICANT CHANGE UP (ref 4.6–12)
VIT B12 SERPL-MCNC: 502 PG/ML — SIGNIFICANT CHANGE UP (ref 232–1245)
WBC # BLD: 11 K/UL — HIGH (ref 3.8–10.5)
WBC # BLD: 9.9 K/UL — SIGNIFICANT CHANGE UP (ref 3.8–10.5)
WBC # FLD AUTO: 11 K/UL — HIGH (ref 3.8–10.5)
WBC # FLD AUTO: 9.9 K/UL — SIGNIFICANT CHANGE UP (ref 3.8–10.5)

## 2018-10-20 PROCEDURE — 99223 1ST HOSP IP/OBS HIGH 75: CPT | Mod: GC

## 2018-10-20 RX ORDER — INFLUENZA VIRUS VACCINE 15; 15; 15; 15 UG/.5ML; UG/.5ML; UG/.5ML; UG/.5ML
0.5 SUSPENSION INTRAMUSCULAR ONCE
Qty: 0 | Refills: 0 | Status: COMPLETED | OUTPATIENT
Start: 2018-10-20 | End: 2018-10-20

## 2018-10-20 RX ORDER — MAGNESIUM SULFATE 500 MG/ML
1 VIAL (ML) INJECTION ONCE
Qty: 0 | Refills: 0 | Status: COMPLETED | OUTPATIENT
Start: 2018-10-20 | End: 2018-10-20

## 2018-10-20 RX ORDER — LATANOPROST 0.05 MG/ML
1 SOLUTION/ DROPS OPHTHALMIC; TOPICAL AT BEDTIME
Qty: 0 | Refills: 0 | Status: DISCONTINUED | OUTPATIENT
Start: 2018-10-20 | End: 2018-10-26

## 2018-10-20 RX ORDER — POTASSIUM CHLORIDE 20 MEQ
40 PACKET (EA) ORAL ONCE
Qty: 0 | Refills: 0 | Status: DISCONTINUED | OUTPATIENT
Start: 2018-10-20 | End: 2018-10-20

## 2018-10-20 RX ORDER — TIMOLOL 0.5 %
1 DROPS OPHTHALMIC (EYE)
Qty: 0 | Refills: 0 | Status: DISCONTINUED | OUTPATIENT
Start: 2018-10-20 | End: 2018-10-26

## 2018-10-20 RX ORDER — SENNA PLUS 8.6 MG/1
2 TABLET ORAL AT BEDTIME
Qty: 0 | Refills: 0 | Status: DISCONTINUED | OUTPATIENT
Start: 2018-10-20 | End: 2018-10-20

## 2018-10-20 RX ORDER — SODIUM CHLORIDE 9 MG/ML
1000 INJECTION, SOLUTION INTRAVENOUS
Qty: 0 | Refills: 0 | Status: DISCONTINUED | OUTPATIENT
Start: 2018-10-20 | End: 2018-10-20

## 2018-10-20 RX ORDER — POLYETHYLENE GLYCOL 3350 17 G/17G
17 POWDER, FOR SOLUTION ORAL
Qty: 0 | Refills: 0 | Status: DISCONTINUED | OUTPATIENT
Start: 2018-10-20 | End: 2018-10-20

## 2018-10-20 RX ORDER — POTASSIUM CHLORIDE 20 MEQ
10 PACKET (EA) ORAL
Qty: 0 | Refills: 0 | Status: COMPLETED | OUTPATIENT
Start: 2018-10-20 | End: 2018-10-20

## 2018-10-20 RX ORDER — SIMETHICONE 80 MG/1
80 TABLET, CHEWABLE ORAL
Qty: 0 | Refills: 0 | Status: DISCONTINUED | OUTPATIENT
Start: 2018-10-20 | End: 2018-10-26

## 2018-10-20 RX ORDER — POTASSIUM CHLORIDE 20 MEQ
40 PACKET (EA) ORAL ONCE
Qty: 0 | Refills: 0 | Status: COMPLETED | OUTPATIENT
Start: 2018-10-20 | End: 2018-10-20

## 2018-10-20 RX ORDER — LOPERAMIDE HCL 2 MG
2 TABLET ORAL ONCE
Qty: 0 | Refills: 0 | Status: COMPLETED | OUTPATIENT
Start: 2018-10-20 | End: 2018-10-20

## 2018-10-20 RX ORDER — POTASSIUM PHOSPHATE, MONOBASIC POTASSIUM PHOSPHATE, DIBASIC 236; 224 MG/ML; MG/ML
30 INJECTION, SOLUTION INTRAVENOUS ONCE
Qty: 0 | Refills: 0 | Status: COMPLETED | OUTPATIENT
Start: 2018-10-20 | End: 2018-10-20

## 2018-10-20 RX ORDER — POTASSIUM CHLORIDE 20 MEQ
10 PACKET (EA) ORAL
Qty: 0 | Refills: 0 | Status: DISCONTINUED | OUTPATIENT
Start: 2018-10-20 | End: 2018-10-20

## 2018-10-20 RX ORDER — POTASSIUM CHLORIDE 20 MEQ
40 PACKET (EA) ORAL EVERY 4 HOURS
Qty: 0 | Refills: 0 | Status: DISCONTINUED | OUTPATIENT
Start: 2018-10-20 | End: 2018-10-20

## 2018-10-20 RX ORDER — POTASSIUM CHLORIDE 20 MEQ
20 PACKET (EA) ORAL ONCE
Qty: 0 | Refills: 0 | Status: COMPLETED | OUTPATIENT
Start: 2018-10-20 | End: 2018-10-20

## 2018-10-20 RX ORDER — VANCOMYCIN HCL 1 G
1000 VIAL (EA) INTRAVENOUS EVERY 24 HOURS
Qty: 0 | Refills: 0 | Status: DISCONTINUED | OUTPATIENT
Start: 2018-10-20 | End: 2018-10-21

## 2018-10-20 RX ADMIN — LATANOPROST 1 DROP(S): 0.05 SOLUTION/ DROPS OPHTHALMIC; TOPICAL at 21:34

## 2018-10-20 RX ADMIN — SIMETHICONE 80 MILLIGRAM(S): 80 TABLET, CHEWABLE ORAL at 17:06

## 2018-10-20 RX ADMIN — Medication 100 MILLIEQUIVALENT(S): at 12:35

## 2018-10-20 RX ADMIN — Medication 10 MILLIEQUIVALENT(S): at 00:33

## 2018-10-20 RX ADMIN — SODIUM CHLORIDE 80 MILLILITER(S): 9 INJECTION, SOLUTION INTRAVENOUS at 03:59

## 2018-10-20 RX ADMIN — Medication 2 MILLIGRAM(S): at 17:06

## 2018-10-20 RX ADMIN — POLYETHYLENE GLYCOL 3350 17 GRAM(S): 17 POWDER, FOR SOLUTION ORAL at 08:50

## 2018-10-20 RX ADMIN — SODIUM CHLORIDE 150 MILLILITER(S): 9 INJECTION, SOLUTION INTRAVENOUS at 21:34

## 2018-10-20 RX ADMIN — SODIUM CHLORIDE 80 MILLILITER(S): 9 INJECTION, SOLUTION INTRAVENOUS at 07:24

## 2018-10-20 RX ADMIN — Medication 40 MILLIEQUIVALENT(S): at 03:59

## 2018-10-20 RX ADMIN — SODIUM CHLORIDE 1000 MILLILITER(S): 9 INJECTION INTRAMUSCULAR; INTRAVENOUS; SUBCUTANEOUS at 00:33

## 2018-10-20 RX ADMIN — Medication 100 GRAM(S): at 21:34

## 2018-10-20 RX ADMIN — Medication 100 MILLIEQUIVALENT(S): at 13:37

## 2018-10-20 RX ADMIN — POTASSIUM PHOSPHATE, MONOBASIC POTASSIUM PHOSPHATE, DIBASIC 85 MILLIMOLE(S): 236; 224 INJECTION, SOLUTION INTRAVENOUS at 15:57

## 2018-10-20 RX ADMIN — Medication 40 MILLIEQUIVALENT(S): at 03:58

## 2018-10-20 RX ADMIN — Medication 250 MILLIGRAM(S): at 19:13

## 2018-10-20 RX ADMIN — Medication 1 DROP(S): at 17:06

## 2018-10-20 RX ADMIN — Medication 40 MILLIEQUIVALENT(S): at 11:36

## 2018-10-20 RX ADMIN — Medication 100 MILLIEQUIVALENT(S): at 13:32

## 2018-10-20 RX ADMIN — Medication 20 MILLIEQUIVALENT(S): at 21:34

## 2018-10-20 RX ADMIN — Medication 40 MILLIEQUIVALENT(S): at 13:37

## 2018-10-20 NOTE — H&P ADULT - PROBLEM SELECTOR PLAN 10
1) PCP Contacted on Admission: (N) --> Name & Phone #:   2) Date of Contact with PCP:  3) PCP Contacted at Discharge: (Y/N, N/A)  4) Summary of Handoff Given to PCP:   5) Post-Discharge Appointment Date and Location:
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
## 2018-10-20 NOTE — H&P ADULT - PROBLEM SELECTOR PLAN 2
-K 2.2 initially. persistent hypokalemia. Unclear etiology.  -Received 110 meq prior to arrival  -will give additional 80 meq PO now. K+ 2.8.  -no EKG changes  -f/u urine anion gap  -trend BMP and Mg

## 2018-10-20 NOTE — H&P ADULT - PROBLEM SELECTOR PLAN 3
-Na 130, stable. Unclear etiology. not improved with 2L NS  -f/u urine studies, TSH, serum osm  -trend BMP  -c/w D5NS at 80 for now

## 2018-10-20 NOTE — PHYSICAL THERAPY INITIAL EVALUATION ADULT - LEVEL OF CONSCIOUSNESS, REHAB EVAL
lethargic/somnolent/intermittently delayed responses requiring repeat of questions/commands ~25% of the time

## 2018-10-20 NOTE — PHYSICAL THERAPY INITIAL EVALUATION ADULT - ADDITIONAL COMMENTS
Patient lives alone in an elevator apartment with no steps to enter. Prior to admission, patient was independent for all functional mobility and ADLs without assistive device. Endorses one fall in past 6 months secondary to imbalance

## 2018-10-20 NOTE — PROGRESS NOTE ADULT - PROBLEM SELECTOR PLAN 1
-presenting with cr 2.8. unknown baseline. reports no hx kidney dz.  -unclear etiology. suspect prerenal as improved with IVF  -f/u urine lytes calculate FeNa and urinary anion gap  -strict I/O  -avoid nephrotoxic agents  -trend BMP  -started on ringer's lactate

## 2018-10-20 NOTE — H&P ADULT - PROBLEM SELECTOR PLAN 4
-Trop I peaked at 0.12. No ischemic changes on EKG. Suspect 2/2 to renal failure. Denies CV symptoms.
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
## 2018-10-20 NOTE — H&P ADULT - ASSESSMENT
Pt is a 78M, poor historian, with a PMHx HTN, depression and anxiety with toxic metabolic encephalopathy, acute renal failure, hypokalemia and hyponatremia. JUAN MIGUEL WHITE RN

## 2018-10-20 NOTE — PHYSICAL THERAPY INITIAL EVALUATION ADULT - MANUAL MUSCLE TESTING RESULTS, REHAB EVAL
Strength grossly at least 3/5 based on functional assessment of bed mobility - patient deferred transfers and ambulation due to feeling timid about OOB activities

## 2018-10-20 NOTE — PROGRESS NOTE ADULT - PROBLEM SELECTOR PLAN 2
K 2.2 on admission. persistent hypokalemia. Likely 2/2 diarrhea.  pt initially stated he hasn't had BM in 2-3 days but on further questioning stated he has also had diarrhea. Received 110 meq prior to arrival  -no EKG changes.  -f/u urine anion gap  -trend BMP and Mg

## 2018-10-20 NOTE — H&P ADULT - PROBLEM SELECTOR PLAN 7
SCDs  RMF  full code  no protonix -Denies antidepressant use. Previously on sertraline. Denies SI/HI

## 2018-10-20 NOTE — H&P ADULT - NSHPPHYSICALEXAM_GEN_ALL_CORE
General:  NAD, nontoxic appearing, WDWN, ox3  HENT:  EOMI, PERRL.  No sinus tenderness.  MMM   Neck:  Trachea midline.  No JVD, LAD, or thyromegaly.  Heart:  S1S2 no M/R/G, rrr  Lungs:  CTAB no wheezing, rhonchi or rales.  No accessory muscle use.  No respiratory distress.  Abdomen:  NABS.  soft, nontender, nondistended.  no guarding.  no ascites.  no organomegaly.  Vascular:  Peripheral pulses palpable  Extremities:  trace LE edema b/l  Back:  No CVA tenderness  Neuro:  AOx3, no facial asymmetry, nonfocal, no slurred speech  CN: II-XII grossly intact bl  Strength- 5/5 throughout  sensation- intact to light touch throughout  gait deferred  no ataxia  flat affect  Skin:  No rash

## 2018-10-20 NOTE — PHYSICAL THERAPY INITIAL EVALUATION ADULT - CRITERIA FOR SKILLED THERAPEUTIC INTERVENTIONS
functional limitations in following categories/risk reduction/prevention/therapy frequency/anticipated discharge recommendation/impairments found/rehab potential/anticipated equipment needs at discharge

## 2018-10-20 NOTE — H&P ADULT - PROBLEM SELECTOR PLAN 8
F: D5NS at 79  E: replete PRN  N: DASH, renal diet F:Ringer's lactate at 150cc/h  E: replete PRN  N: DASH, renal diet

## 2018-10-20 NOTE — PROGRESS NOTE ADULT - PROBLEM SELECTOR PLAN 6
-Says he is on an antihypertensive but is unsure what.  -Currently normotensive. obtain collateral in am.    #unintentional weight loss  -unclear etiology. sounds chronic, as had similar admission three years ago but left AMA. BMI WNL. check TSH.

## 2018-10-20 NOTE — PHYSICAL THERAPY INITIAL EVALUATION ADULT - PATIENT/FAMILY/SIGNIFICANT OTHER GOALS STATEMENT, PT EVAL
Patient is nervous to perform out of bed activities and does not want to ambulate today however agreeable to sit up

## 2018-10-20 NOTE — PHYSICAL THERAPY INITIAL EVALUATION ADULT - IMPAIRMENTS FOUND, PT EVAL
aerobic capacity/endurance/arousal, attention, and cognition/muscle strength/gait, locomotion, and balance/posture

## 2018-10-20 NOTE — H&P ADULT - PROBLEM SELECTOR PLAN 6
F: D5NS at 79  E: replete PRN  N: DASH, renal diet -Says he is on an antihypertensive but is unsure what.  -Currently normotensive. obtain collateral in am.    #unintentional weight loss  -unclear etiology. sounds chronic, as had similar admission three years ago but left AMA. BMI WNL. check TSH.

## 2018-10-20 NOTE — PROGRESS NOTE ADULT - SUBJECTIVE AND OBJECTIVE BOX
OVERNIGHT EVENTS:    SUBJECTIVE / INTERVAL HPI: Patient seen and examined at bedside.     VITAL SIGNS:  Vital Signs Last 24 Hrs  T(C): 36.4 (20 Oct 2018 15:55), Max: 36.9 (20 Oct 2018 08:51)  T(F): 97.6 (20 Oct 2018 15:55), Max: 98.5 (20 Oct 2018 08:51)  HR: 87 (20 Oct 2018 16:56) (65 - 87)  BP: 150/81 (20 Oct 2018 16:56) (143/81 - 188/103)  BP(mean): --  RR: 18 (20 Oct 2018 15:55) (16 - 19)  SpO2: 100% (20 Oct 2018 15:55) (97% - 100%)    PHYSICAL EXAM:    General: NAD  HEENT: NCAT; PERRL, anicteric sclera  Neck: supple, trachea midline  Cardiovascular: S1, S2 normal; RRR, no M/G/R  Respiratory: CTABL; no W/R/R  Gastrointestinal: soft, nontender, nondistended. bowel sounds present.  Skin: no ulcerations or visible rashes appreciated  Extremities: WWP; no edema, clubbing or cyanosis  Vascular: 2+ radial, DP/PT pulses B/L  Neurological: AAOx3; no focal deficits    MEDICATIONS:  MEDICATIONS  (STANDING):  influenza   Vaccine 0.5 milliLiter(s) IntraMuscular once  lactated ringers. 1000 milliLiter(s) (150 mL/Hr) IV Continuous <Continuous>  latanoprost 0.005% Ophthalmic Solution 1 Drop(s) Both EYES at bedtime  timolol 0.25% Solution 1 Drop(s) Both EYES two times a day  vancomycin  IVPB 1000 milliGRAM(s) IV Intermittent every 24 hours    MEDICATIONS  (PRN):  simethicone 80 milliGRAM(s) Chew four times a day PRN Gas      ALLERGIES:  Allergies    No Known Allergies    Intolerances        LABS:                        14.4   9.9   )-----------( 189      ( 20 Oct 2018 06:50 )             38.7     10-20    132<L>  |  102  |  33<H>  ----------------------------<  189<H>  3.9   |  13<L>  |  1.74<H>    Ca    9.0      20 Oct 2018 15:43  Phos  1.3     10-20  Mg     2.0     10-20    TPro  7.2  /  Alb  3.7  /  TBili  0.6  /  DBili  x   /  AST  26  /  ALT  15  /  AlkPhos  77  10-19    PT/INR - ( 20 Oct 2018 06:50 )   PT: 13.1 sec;   INR: 1.18          PTT - ( 20 Oct 2018 06:50 )  PTT:29.0 sec  Urinalysis Basic - ( 19 Oct 2018 18:21 )    Color: Yellow / Appearance: Clear / S.025 / pH: x  Gluc: x / Ketone: 40 mg/dL  / Bili: Large / Urobili: 1.0 E.U./dL   Blood: x / Protein: 30 mg/dL / Nitrite: NEGATIVE   Leuk Esterase: NEGATIVE / RBC: x / WBC 5-10 /HPF   Sq Epi: x / Non Sq Epi: 0-5 /HPF / Bacteria: x      CAPILLARY BLOOD GLUCOSE      POCT Blood Glucose.: 149 mg/dL (19 Oct 2018 16:50)      RADIOLOGY & ADDITIONAL TESTS: Reviewed. OVERNIGHT EVENTS: admitted overnight    SUBJECTIVE / INTERVAL HPI: Patient seen and examined at bedside.  Pt states he has not had BM in 2-3 days and feels gassy. Denies fever/chills, CP, SOB, abd pain, n/v, dysuria, hematuria    VITAL SIGNS:  Vital Signs Last 24 Hrs  T(C): 36.4 (20 Oct 2018 15:55), Max: 36.9 (20 Oct 2018 08:51)  T(F): 97.6 (20 Oct 2018 15:55), Max: 98.5 (20 Oct 2018 08:51)  HR: 87 (20 Oct 2018 16:56) (65 - 87)  BP: 150/81 (20 Oct 2018 16:56) (143/81 - 188/103)  BP(mean): --  RR: 18 (20 Oct 2018 15:55) (16 - 19)  SpO2: 100% (20 Oct 2018 15:55) (97% - 100%)    PHYSICAL EXAM:    General: NAD, disheveled, poor hygeine,   HEENT: NCAT; PERRL, anicteric sclera  Neck: supple, trachea midline  Cardiovascular: S1, S2 normal; RRR, no M/G/R  Respiratory: CTABL; no W/R/R  Gastrointestinal: soft, nontender, nondistended. bowel sounds present.  Skin: no ulcerations or visible rashes appreciated  Extremities: WWP; no edema, clubbing or cyanosis  Vascular: 2+ radial, DP/PT pulses B/L  Neurological: AAOx3; no focal deficits    MEDICATIONS:  MEDICATIONS  (STANDING):  influenza   Vaccine 0.5 milliLiter(s) IntraMuscular once  lactated ringers. 1000 milliLiter(s) (150 mL/Hr) IV Continuous <Continuous>  latanoprost 0.005% Ophthalmic Solution 1 Drop(s) Both EYES at bedtime  timolol 0.25% Solution 1 Drop(s) Both EYES two times a day  vancomycin  IVPB 1000 milliGRAM(s) IV Intermittent every 24 hours    MEDICATIONS  (PRN):  simethicone 80 milliGRAM(s) Chew four times a day PRN Gas      ALLERGIES:  Allergies    No Known Allergies    Intolerances        LABS:                        14.4   9.9   )-----------( 189      ( 20 Oct 2018 06:50 )             38.7     10-20    132<L>  |  102  |  33<H>  ----------------------------<  189<H>  3.9   |  13<L>  |  1.74<H>    Ca    9.0      20 Oct 2018 15:43  Phos  1.3     10-20  Mg     2.0     10-    TPro  7.2  /  Alb  3.7  /  TBili  0.6  /  DBili  x   /  AST  26  /  ALT  15  /  AlkPhos  77  10-19    PT/INR - ( 20 Oct 2018 06:50 )   PT: 13.1 sec;   INR: 1.18          PTT - ( 20 Oct 2018 06:50 )  PTT:29.0 sec  Urinalysis Basic - ( 19 Oct 2018 18:21 )    Color: Yellow / Appearance: Clear / S.025 / pH: x  Gluc: x / Ketone: 40 mg/dL  / Bili: Large / Urobili: 1.0 E.U./dL   Blood: x / Protein: 30 mg/dL / Nitrite: NEGATIVE   Leuk Esterase: NEGATIVE / RBC: x / WBC 5-10 /HPF   Sq Epi: x / Non Sq Epi: 0-5 /HPF / Bacteria: x      CAPILLARY BLOOD GLUCOSE      POCT Blood Glucose.: 149 mg/dL (19 Oct 2018 16:50)      RADIOLOGY & ADDITIONAL TESTS: Reviewed.

## 2018-10-20 NOTE — H&P ADULT - PROBLEM SELECTOR PLAN 5
-Patient with delayed responses. oriented x 3 but has difficulty remembering things such as medications, PMHx. Likely 2/2 Xanax vs hyponatremia, dehydration.  -No e/o infection aside from leukocytosis (normal diff, downtrending).   -B12, TSH, RPR  -avoid sedating medications

## 2018-10-20 NOTE — H&P ADULT - PROBLEM SELECTOR PLAN 1
-presenting with cr 2.8. unknown baseline. reports no hx kidney dz.  -unclear etiology. suspect prerenal as improved with IVF  -f/u urine lytes calculate FeNa and urinary anion gap  -strict I/O  -avoid nephrotoxic agents  -trend BMP

## 2018-10-21 LAB
ANION GAP SERPL CALC-SCNC: 18 MMOL/L — HIGH (ref 5–17)
BUN SERPL-MCNC: 22 MG/DL — SIGNIFICANT CHANGE UP (ref 7–23)
CALCIUM SERPL-MCNC: 8.8 MG/DL — SIGNIFICANT CHANGE UP (ref 8.4–10.5)
CHLORIDE SERPL-SCNC: 102 MMOL/L — SIGNIFICANT CHANGE UP (ref 96–108)
CHLORIDE UR-SCNC: 120 MMOL/L — SIGNIFICANT CHANGE UP
CO2 SERPL-SCNC: 13 MMOL/L — LOW (ref 22–31)
CREAT ?TM UR-MCNC: 112 MG/DL — SIGNIFICANT CHANGE UP
CREAT SERPL-MCNC: 1.5 MG/DL — HIGH (ref 0.5–1.3)
CULTURE RESULTS: SIGNIFICANT CHANGE UP
GLUCOSE SERPL-MCNC: 114 MG/DL — HIGH (ref 70–99)
HCT VFR BLD CALC: 35.8 % — LOW (ref 39–50)
HGB BLD-MCNC: 13.3 G/DL — SIGNIFICANT CHANGE UP (ref 13–17)
MAGNESIUM SERPL-MCNC: 2 MG/DL — SIGNIFICANT CHANGE UP (ref 1.6–2.6)
MCHC RBC-ENTMCNC: 31.1 PG — SIGNIFICANT CHANGE UP (ref 27–34)
MCHC RBC-ENTMCNC: 37.2 G/DL — HIGH (ref 32–36)
MCV RBC AUTO: 83.6 FL — SIGNIFICANT CHANGE UP (ref 80–100)
ORGANISM # SPEC MICROSCOPIC CNT: SIGNIFICANT CHANGE UP
ORGANISM # SPEC MICROSCOPIC CNT: SIGNIFICANT CHANGE UP
OSMOLALITY UR: 559 MOSMOL/KG — SIGNIFICANT CHANGE UP (ref 100–650)
PHOSPHATE SERPL-MCNC: 1.6 MG/DL — LOW (ref 2.5–4.5)
PLATELET # BLD AUTO: 214 K/UL — SIGNIFICANT CHANGE UP (ref 150–400)
POTASSIUM SERPL-MCNC: 3.2 MMOL/L — LOW (ref 3.5–5.3)
POTASSIUM SERPL-SCNC: 3.2 MMOL/L — LOW (ref 3.5–5.3)
POTASSIUM UR-SCNC: 38 MMOL/L — SIGNIFICANT CHANGE UP
RBC # BLD: 4.28 M/UL — SIGNIFICANT CHANGE UP (ref 4.2–5.8)
RBC # FLD: 12.4 % — SIGNIFICANT CHANGE UP (ref 10.3–16.9)
SODIUM SERPL-SCNC: 133 MMOL/L — LOW (ref 135–145)
SODIUM UR-SCNC: 73 MMOL/L — SIGNIFICANT CHANGE UP
SPECIMEN SOURCE: SIGNIFICANT CHANGE UP
T PALLIDUM AB TITR SER: NEGATIVE — SIGNIFICANT CHANGE UP
UUN UR-MCNC: 755 MG/DL — SIGNIFICANT CHANGE UP
WBC # BLD: 8.7 K/UL — SIGNIFICANT CHANGE UP (ref 3.8–10.5)
WBC # FLD AUTO: 8.7 K/UL — SIGNIFICANT CHANGE UP (ref 3.8–10.5)

## 2018-10-21 PROCEDURE — 99232 SBSQ HOSP IP/OBS MODERATE 35: CPT | Mod: GC

## 2018-10-21 RX ORDER — SODIUM CHLORIDE 9 MG/ML
1000 INJECTION, SOLUTION INTRAVENOUS
Qty: 0 | Refills: 0 | Status: DISCONTINUED | OUTPATIENT
Start: 2018-10-21 | End: 2018-10-22

## 2018-10-21 RX ORDER — LANOLIN ALCOHOL/MO/W.PET/CERES
5 CREAM (GRAM) TOPICAL AT BEDTIME
Qty: 0 | Refills: 0 | Status: DISCONTINUED | OUTPATIENT
Start: 2018-10-21 | End: 2018-10-26

## 2018-10-21 RX ORDER — HYDRALAZINE HCL 50 MG
10 TABLET ORAL EVERY 8 HOURS
Qty: 0 | Refills: 0 | Status: DISCONTINUED | OUTPATIENT
Start: 2018-10-21 | End: 2018-10-21

## 2018-10-21 RX ORDER — HYDRALAZINE HCL 50 MG
25 TABLET ORAL EVERY 8 HOURS
Qty: 0 | Refills: 0 | Status: DISCONTINUED | OUTPATIENT
Start: 2018-10-22 | End: 2018-10-22

## 2018-10-21 RX ORDER — POTASSIUM CHLORIDE 20 MEQ
20 PACKET (EA) ORAL
Qty: 0 | Refills: 0 | Status: DISCONTINUED | OUTPATIENT
Start: 2018-10-21 | End: 2018-10-21

## 2018-10-21 RX ORDER — MAGNESIUM SULFATE 500 MG/ML
1 VIAL (ML) INJECTION ONCE
Qty: 0 | Refills: 0 | Status: COMPLETED | OUTPATIENT
Start: 2018-10-21 | End: 2018-10-21

## 2018-10-21 RX ORDER — POTASSIUM CHLORIDE 20 MEQ
20 PACKET (EA) ORAL
Qty: 0 | Refills: 0 | Status: COMPLETED | OUTPATIENT
Start: 2018-10-21 | End: 2018-10-21

## 2018-10-21 RX ORDER — AMLODIPINE BESYLATE 2.5 MG/1
5 TABLET ORAL DAILY
Qty: 0 | Refills: 0 | Status: DISCONTINUED | OUTPATIENT
Start: 2018-10-21 | End: 2018-10-21

## 2018-10-21 RX ORDER — POTASSIUM CHLORIDE 20 MEQ
40 PACKET (EA) ORAL ONCE
Qty: 0 | Refills: 0 | Status: DISCONTINUED | OUTPATIENT
Start: 2018-10-21 | End: 2018-10-21

## 2018-10-21 RX ORDER — AMLODIPINE BESYLATE 2.5 MG/1
10 TABLET ORAL DAILY
Qty: 0 | Refills: 0 | Status: DISCONTINUED | OUTPATIENT
Start: 2018-10-21 | End: 2018-10-21

## 2018-10-21 RX ORDER — POTASSIUM CHLORIDE 20 MEQ
20 PACKET (EA) ORAL ONCE
Qty: 0 | Refills: 0 | Status: COMPLETED | OUTPATIENT
Start: 2018-10-21 | End: 2018-10-21

## 2018-10-21 RX ORDER — AMLODIPINE BESYLATE 2.5 MG/1
10 TABLET ORAL DAILY
Qty: 0 | Refills: 0 | Status: DISCONTINUED | OUTPATIENT
Start: 2018-10-22 | End: 2018-10-26

## 2018-10-21 RX ADMIN — SODIUM CHLORIDE 150 MILLILITER(S): 9 INJECTION, SOLUTION INTRAVENOUS at 21:09

## 2018-10-21 RX ADMIN — Medication 20 MILLIEQUIVALENT(S): at 12:11

## 2018-10-21 RX ADMIN — Medication 20 MILLIEQUIVALENT(S): at 14:20

## 2018-10-21 RX ADMIN — Medication 10 MILLIGRAM(S): at 21:09

## 2018-10-21 RX ADMIN — LATANOPROST 1 DROP(S): 0.05 SOLUTION/ DROPS OPHTHALMIC; TOPICAL at 21:09

## 2018-10-21 RX ADMIN — Medication 1 DROP(S): at 06:26

## 2018-10-21 RX ADMIN — Medication 10 MILLIGRAM(S): at 14:20

## 2018-10-21 RX ADMIN — Medication 1 DROP(S): at 17:58

## 2018-10-21 RX ADMIN — Medication 10 MILLIGRAM(S): at 06:02

## 2018-10-21 RX ADMIN — Medication 5 MILLIGRAM(S): at 21:29

## 2018-10-21 RX ADMIN — AMLODIPINE BESYLATE 5 MILLIGRAM(S): 2.5 TABLET ORAL at 10:09

## 2018-10-21 RX ADMIN — SODIUM CHLORIDE 150 MILLILITER(S): 9 INJECTION, SOLUTION INTRAVENOUS at 12:11

## 2018-10-21 RX ADMIN — Medication 20 MILLIEQUIVALENT(S): at 07:42

## 2018-10-21 RX ADMIN — Medication 100 GRAM(S): at 07:42

## 2018-10-21 RX ADMIN — Medication 20 MILLIEQUIVALENT(S): at 14:22

## 2018-10-21 NOTE — PROGRESS NOTE ADULT - SUBJECTIVE AND OBJECTIVE BOX
Patient is a 78y old  Male who presents with a chief complaint of renal failure (20 Oct 2018 19:39)      INTERVAL HPI/OVERNIGHT EVENTS: No acute events O/N. No complaints at this time.  Diarrhea resolved.    Review of Systems: 12 point review of systems otherwise negative      MEDICATIONS  (STANDING):  amLODIPine   Tablet 5 milliGRAM(s) Oral daily  hydrALAZINE 10 milliGRAM(s) Oral every 8 hours  influenza   Vaccine 0.5 milliLiter(s) IntraMuscular once  lactated ringers. 1000 milliLiter(s) (150 mL/Hr) IV Continuous <Continuous>  latanoprost 0.005% Ophthalmic Solution 1 Drop(s) Both EYES at bedtime  timolol 0.25% Solution 1 Drop(s) Both EYES two times a day    MEDICATIONS  (PRN):  simethicone 80 milliGRAM(s) Chew four times a day PRN Gas      Allergies    No Known Allergies    Intolerances          Vital Signs Last 24 Hrs  T(C): 36.9 (21 Oct 2018 08:18), Max: 37 (20 Oct 2018 21:02)  T(F): 98.5 (21 Oct 2018 08:18), Max: 98.6 (20 Oct 2018 21:02)  HR: 67 (21 Oct 2018 08:18) (64 - 87)  BP: 170/79 (21 Oct 2018 08:18) (150/81 - 198/96)  BP(mean): --  RR: 20 (21 Oct 2018 08:18) (18 - 20)  SpO2: 98% (21 Oct 2018 08:18) (97% - 100%)  CAPILLARY BLOOD GLUCOSE          10-20 @ 07:01  -  10- @ 07:00  --------------------------------------------------------  IN: 700 mL / OUT: 0 mL / NET: 700 mL        Physical Exam:    Daily     Daily   General:  NAD  HEENT:  Nonicteric  CV:  RRR, no murmur, no JVD  Lungs:  CTA B/L, no wheezes, rales, rhonchi  Abdomen:  Soft, non-tender  Extremities:  No edema  Skin:  Warm and dry, no rashes  :  No cherry  Neuro:  Nonfocal  No Restraints    LABS:                        13.3   8.7   )-----------( 214      ( 21 Oct 2018 07:24 )             35.8     10-    133<L>  |  102  |  22  ----------------------------<  114<H>  3.2<L>   |  13<L>  |  1.50<H>    Ca    8.8      21 Oct 2018 07:24  Phos  1.6     10-  Mg     2.0     10-    TPro  7.2  /  Alb  3.7  /  TBili  0.6  /  DBili  x   /  AST  26  /  ALT  15  /  AlkPhos  77  10-    PT/INR - ( 20 Oct 2018 06:50 )   PT: 13.1 sec;   INR: 1.18          PTT - ( 20 Oct 2018 06:50 )  PTT:29.0 sec  Urinalysis Basic - ( 19 Oct 2018 18:21 )    Color: Yellow / Appearance: Clear / S.025 / pH: x  Gluc: x / Ketone: 40 mg/dL  / Bili: Large / Urobili: 1.0 E.U./dL   Blood: x / Protein: 30 mg/dL / Nitrite: NEGATIVE   Leuk Esterase: NEGATIVE / RBC: x / WBC 5-10 /HPF   Sq Epi: x / Non Sq Epi: 0-5 /HPF / Bacteria: x

## 2018-10-21 NOTE — PROGRESS NOTE ADULT - PROBLEM SELECTOR PLAN 5
-Started Norvasc    #unintentional weight loss  -unclear etiology. sounds chronic, as had similar admission three years ago but left AMA. BMI WNL. check TSH.

## 2018-10-22 DIAGNOSIS — I16.1 HYPERTENSIVE EMERGENCY: ICD-10-CM

## 2018-10-22 LAB
ALBUMIN SERPL ELPH-MCNC: 4 G/DL — SIGNIFICANT CHANGE UP (ref 3.3–5)
ALP SERPL-CCNC: 71 U/L — SIGNIFICANT CHANGE UP (ref 40–120)
ALT FLD-CCNC: 8 U/L — LOW (ref 10–45)
ANION GAP SERPL CALC-SCNC: 17 MMOL/L — SIGNIFICANT CHANGE UP (ref 5–17)
ANION GAP SERPL CALC-SCNC: 18 MMOL/L — HIGH (ref 5–17)
APTT BLD: 32.3 SEC — SIGNIFICANT CHANGE UP (ref 27.5–37.4)
AST SERPL-CCNC: 16 U/L — SIGNIFICANT CHANGE UP (ref 10–40)
BASE EXCESS BLDV CALC-SCNC: -9.9 MMOL/L — SIGNIFICANT CHANGE UP
BASOPHILS NFR BLD AUTO: 0.6 % — SIGNIFICANT CHANGE UP (ref 0–2)
BILIRUB SERPL-MCNC: 0.7 MG/DL — SIGNIFICANT CHANGE UP (ref 0.2–1.2)
BLD GP AB SCN SERPL QL: NEGATIVE — SIGNIFICANT CHANGE UP
BLD GP AB SCN SERPL QL: NEGATIVE — SIGNIFICANT CHANGE UP
BUN SERPL-MCNC: 11 MG/DL — SIGNIFICANT CHANGE UP (ref 7–23)
BUN SERPL-MCNC: 13 MG/DL — SIGNIFICANT CHANGE UP (ref 7–23)
CALCIUM SERPL-MCNC: 9 MG/DL — SIGNIFICANT CHANGE UP (ref 8.4–10.5)
CALCIUM SERPL-MCNC: 9 MG/DL — SIGNIFICANT CHANGE UP (ref 8.4–10.5)
CHLORIDE SERPL-SCNC: 100 MMOL/L — SIGNIFICANT CHANGE UP (ref 96–108)
CHLORIDE SERPL-SCNC: 101 MMOL/L — SIGNIFICANT CHANGE UP (ref 96–108)
CK MB CFR SERPL CALC: 5.6 NG/ML — SIGNIFICANT CHANGE UP (ref 0–6.7)
CK SERPL-CCNC: 76 U/L — SIGNIFICANT CHANGE UP (ref 30–200)
CO2 SERPL-SCNC: 14 MMOL/L — LOW (ref 22–31)
CO2 SERPL-SCNC: 17 MMOL/L — LOW (ref 22–31)
CREAT SERPL-MCNC: 1.17 MG/DL — SIGNIFICANT CHANGE UP (ref 0.5–1.3)
CREAT SERPL-MCNC: 1.17 MG/DL — SIGNIFICANT CHANGE UP (ref 0.5–1.3)
EOSINOPHIL NFR BLD AUTO: 0.9 % — SIGNIFICANT CHANGE UP (ref 0–6)
GAS PNL BLDV: SIGNIFICANT CHANGE UP
GLUCOSE BLDC GLUCOMTR-MCNC: 134 MG/DL — HIGH (ref 70–99)
GLUCOSE BLDC GLUCOMTR-MCNC: 138 MG/DL — HIGH (ref 70–99)
GLUCOSE BLDC GLUCOMTR-MCNC: 146 MG/DL — HIGH (ref 70–99)
GLUCOSE SERPL-MCNC: 122 MG/DL — HIGH (ref 70–99)
GLUCOSE SERPL-MCNC: 136 MG/DL — HIGH (ref 70–99)
HCO3 BLDV-SCNC: 13 MMOL/L — LOW (ref 20–27)
HCT VFR BLD CALC: 38.7 % — LOW (ref 39–50)
HGB BLD-MCNC: 14.2 G/DL — SIGNIFICANT CHANGE UP (ref 13–17)
INR BLD: 1.19 — HIGH (ref 0.88–1.16)
LACTATE SERPL-SCNC: 2 MMOL/L — SIGNIFICANT CHANGE UP (ref 0.5–2)
LYMPHOCYTES # BLD AUTO: 29.5 % — SIGNIFICANT CHANGE UP (ref 13–44)
MAGNESIUM SERPL-MCNC: 1.8 MG/DL — SIGNIFICANT CHANGE UP (ref 1.6–2.6)
MCHC RBC-ENTMCNC: 31.4 PG — SIGNIFICANT CHANGE UP (ref 27–34)
MCHC RBC-ENTMCNC: 36.7 G/DL — HIGH (ref 32–36)
MCV RBC AUTO: 85.6 FL — SIGNIFICANT CHANGE UP (ref 80–100)
MONOCYTES NFR BLD AUTO: 8 % — SIGNIFICANT CHANGE UP (ref 2–14)
NEUTROPHILS NFR BLD AUTO: 61 % — SIGNIFICANT CHANGE UP (ref 43–77)
PCO2 BLDV: 21 MMHG — LOW (ref 41–51)
PH BLDV: 7.39 — SIGNIFICANT CHANGE UP (ref 7.32–7.43)
PHOSPHATE SERPL-MCNC: 1.4 MG/DL — LOW (ref 2.5–4.5)
PLATELET # BLD AUTO: 209 K/UL — SIGNIFICANT CHANGE UP (ref 150–400)
PO2 BLDV: 82 MMHG — SIGNIFICANT CHANGE UP
POTASSIUM SERPL-MCNC: 3.6 MMOL/L — SIGNIFICANT CHANGE UP (ref 3.5–5.3)
POTASSIUM SERPL-MCNC: 3.7 MMOL/L — SIGNIFICANT CHANGE UP (ref 3.5–5.3)
POTASSIUM SERPL-SCNC: 3.6 MMOL/L — SIGNIFICANT CHANGE UP (ref 3.5–5.3)
POTASSIUM SERPL-SCNC: 3.7 MMOL/L — SIGNIFICANT CHANGE UP (ref 3.5–5.3)
PROT SERPL-MCNC: 7 G/DL — SIGNIFICANT CHANGE UP (ref 6–8.3)
PROTHROM AB SERPL-ACNC: 13.2 SEC — HIGH (ref 9.8–12.7)
RBC # BLD: 4.52 M/UL — SIGNIFICANT CHANGE UP (ref 4.2–5.8)
RBC # FLD: 13.2 % — SIGNIFICANT CHANGE UP (ref 10.3–16.9)
RH IG SCN BLD-IMP: POSITIVE — SIGNIFICANT CHANGE UP
RH IG SCN BLD-IMP: POSITIVE — SIGNIFICANT CHANGE UP
SAO2 % BLDV: 96 % — SIGNIFICANT CHANGE UP
SODIUM SERPL-SCNC: 133 MMOL/L — LOW (ref 135–145)
SODIUM SERPL-SCNC: 134 MMOL/L — LOW (ref 135–145)
TROPONIN T SERPL-MCNC: <0.01 NG/ML — SIGNIFICANT CHANGE UP (ref 0–0.01)
WBC # BLD: 9.1 K/UL — SIGNIFICANT CHANGE UP (ref 3.8–10.5)
WBC # FLD AUTO: 9.1 K/UL — SIGNIFICANT CHANGE UP (ref 3.8–10.5)

## 2018-10-22 PROCEDURE — 99223 1ST HOSP IP/OBS HIGH 75: CPT

## 2018-10-22 PROCEDURE — 99231 SBSQ HOSP IP/OBS SF/LOW 25: CPT | Mod: GC

## 2018-10-22 PROCEDURE — 99233 SBSQ HOSP IP/OBS HIGH 50: CPT | Mod: GC

## 2018-10-22 PROCEDURE — 70450 CT HEAD/BRAIN W/O DYE: CPT | Mod: 26,59

## 2018-10-22 PROCEDURE — 70496 CT ANGIOGRAPHY HEAD: CPT | Mod: 26

## 2018-10-22 PROCEDURE — 70498 CT ANGIOGRAPHY NECK: CPT | Mod: 26

## 2018-10-22 PROCEDURE — 0042T: CPT

## 2018-10-22 RX ORDER — ASPIRIN/CALCIUM CARB/MAGNESIUM 324 MG
81 TABLET ORAL DAILY
Qty: 0 | Refills: 0 | Status: DISCONTINUED | OUTPATIENT
Start: 2018-10-22 | End: 2018-10-26

## 2018-10-22 RX ORDER — POTASSIUM CHLORIDE 20 MEQ
20 PACKET (EA) ORAL ONCE
Qty: 0 | Refills: 0 | Status: COMPLETED | OUTPATIENT
Start: 2018-10-22 | End: 2018-10-22

## 2018-10-22 RX ORDER — CLOPIDOGREL BISULFATE 75 MG/1
75 TABLET, FILM COATED ORAL DAILY
Qty: 0 | Refills: 0 | Status: DISCONTINUED | OUTPATIENT
Start: 2018-10-22 | End: 2018-10-26

## 2018-10-22 RX ORDER — LOSARTAN POTASSIUM 100 MG/1
100 TABLET, FILM COATED ORAL DAILY
Qty: 0 | Refills: 0 | Status: DISCONTINUED | OUTPATIENT
Start: 2018-10-22 | End: 2018-10-26

## 2018-10-22 RX ORDER — ATORVASTATIN CALCIUM 80 MG/1
80 TABLET, FILM COATED ORAL AT BEDTIME
Qty: 0 | Refills: 0 | Status: DISCONTINUED | OUTPATIENT
Start: 2018-10-22 | End: 2018-10-26

## 2018-10-22 RX ORDER — LABETALOL HCL 100 MG
10 TABLET ORAL ONCE
Qty: 0 | Refills: 0 | Status: DISCONTINUED | OUTPATIENT
Start: 2018-10-22 | End: 2018-10-22

## 2018-10-22 RX ORDER — MAGNESIUM SULFATE 500 MG/ML
1 VIAL (ML) INJECTION ONCE
Qty: 0 | Refills: 0 | Status: COMPLETED | OUTPATIENT
Start: 2018-10-22 | End: 2018-10-22

## 2018-10-22 RX ORDER — LOSARTAN POTASSIUM 100 MG/1
100 TABLET, FILM COATED ORAL DAILY
Qty: 0 | Refills: 0 | Status: DISCONTINUED | OUTPATIENT
Start: 2018-10-22 | End: 2018-10-22

## 2018-10-22 RX ORDER — LABETALOL HCL 100 MG
5 TABLET ORAL ONCE
Qty: 0 | Refills: 0 | Status: COMPLETED | OUTPATIENT
Start: 2018-10-22 | End: 2018-10-22

## 2018-10-22 RX ORDER — HALOPERIDOL DECANOATE 100 MG/ML
2 INJECTION INTRAMUSCULAR ONCE
Qty: 0 | Refills: 0 | Status: COMPLETED | OUTPATIENT
Start: 2018-10-22 | End: 2018-10-22

## 2018-10-22 RX ORDER — HEPARIN SODIUM 5000 [USP'U]/ML
5000 INJECTION INTRAVENOUS; SUBCUTANEOUS EVERY 8 HOURS
Qty: 0 | Refills: 0 | Status: DISCONTINUED | OUTPATIENT
Start: 2018-10-22 | End: 2018-10-26

## 2018-10-22 RX ORDER — POTASSIUM PHOSPHATE, MONOBASIC POTASSIUM PHOSPHATE, DIBASIC 236; 224 MG/ML; MG/ML
30 INJECTION, SOLUTION INTRAVENOUS ONCE
Qty: 0 | Refills: 0 | Status: COMPLETED | OUTPATIENT
Start: 2018-10-22 | End: 2018-10-22

## 2018-10-22 RX ORDER — LABETALOL HCL 100 MG
100 TABLET ORAL
Qty: 0 | Refills: 0 | Status: DISCONTINUED | OUTPATIENT
Start: 2018-10-22 | End: 2018-10-23

## 2018-10-22 RX ADMIN — Medication 20 MILLIEQUIVALENT(S): at 09:08

## 2018-10-22 RX ADMIN — Medication 81 MILLIGRAM(S): at 19:11

## 2018-10-22 RX ADMIN — Medication 5 MILLIGRAM(S): at 02:08

## 2018-10-22 RX ADMIN — Medication 5 MILLIGRAM(S): at 22:11

## 2018-10-22 RX ADMIN — Medication 100 MILLIGRAM(S): at 19:11

## 2018-10-22 RX ADMIN — HEPARIN SODIUM 5000 UNIT(S): 5000 INJECTION INTRAVENOUS; SUBCUTANEOUS at 22:11

## 2018-10-22 RX ADMIN — Medication 1 DROP(S): at 06:36

## 2018-10-22 RX ADMIN — CLOPIDOGREL BISULFATE 75 MILLIGRAM(S): 75 TABLET, FILM COATED ORAL at 19:11

## 2018-10-22 RX ADMIN — HALOPERIDOL DECANOATE 2 MILLIGRAM(S): 100 INJECTION INTRAMUSCULAR at 01:58

## 2018-10-22 RX ADMIN — Medication 25 MILLIGRAM(S): at 06:37

## 2018-10-22 RX ADMIN — Medication 100 GRAM(S): at 09:08

## 2018-10-22 RX ADMIN — LOSARTAN POTASSIUM 100 MILLIGRAM(S): 100 TABLET, FILM COATED ORAL at 09:08

## 2018-10-22 RX ADMIN — ATORVASTATIN CALCIUM 80 MILLIGRAM(S): 80 TABLET, FILM COATED ORAL at 22:11

## 2018-10-22 RX ADMIN — Medication 1 DROP(S): at 19:11

## 2018-10-22 RX ADMIN — Medication 25 MILLIGRAM(S): at 15:02

## 2018-10-22 RX ADMIN — AMLODIPINE BESYLATE 10 MILLIGRAM(S): 2.5 TABLET ORAL at 06:37

## 2018-10-22 RX ADMIN — Medication 100 MILLIGRAM(S): at 12:20

## 2018-10-22 RX ADMIN — POTASSIUM PHOSPHATE, MONOBASIC POTASSIUM PHOSPHATE, DIBASIC 85 MILLIMOLE(S): 236; 224 INJECTION, SOLUTION INTRAVENOUS at 09:07

## 2018-10-22 RX ADMIN — LATANOPROST 1 DROP(S): 0.05 SOLUTION/ DROPS OPHTHALMIC; TOPICAL at 22:11

## 2018-10-22 NOTE — PROGRESS NOTE ADULT - PROBLEM SELECTOR PLAN 2
Around 2am 10/22 pt was found to have /112. All other VSS. On exam pt was noted to be lethargic and weak. Was responsive (made grunting noises) but not vocal about if he was in pain/would not provide answers to questions. Given 5mg labetalol IVP. However given change from prior mental status of AAOx3, rapid response was called.  Pt found to have rotary nystagmus. Continued to be weak, not following commands, and very agitated.  Stroke code was called.  Was given haldol 2mg IM; agitated improved and pt was minimally responsive to commands. Unable to obtain EKG given agitation and restlessness. Labs drawn and pt was sent down for CT scan. Dr. Peters was present and the case was discussed with Dr. Mari.Labs notable for mild hyponatremia. CT brain without evidence of hemorrhage of acute infarct. After CT scan pt was much improved: AAOx3, responsive, and following commands with no focal neurologic deficits. ICU was consulted; rejected patient and provided recommendations for primary team. Episode likely 2/2 hypertensive emergency, given improvement with 5mg IV labetalol and improvement of pressures to SBP 150s.  -neuro following. will f/u recs  -CT perfusion w rapid calculated mismatch of 3. not significant per neuro  -CTA neg, CTH neg  -no focal deficits observed on neuro exam

## 2018-10-22 NOTE — PROVIDER CONTACT NOTE (OTHER) - ASSESSMENT
Pt altered with minimal verbal respond on assessment.  Initial vital sign BP- 204/94 HR- 76- RR 20 T- 97.2 O2-100

## 2018-10-22 NOTE — CHART NOTE - NSCHARTNOTEFT_GEN_A_CORE
Around 2am pt was found to have /112. All other VSS. On exam pt was noted to be lethargic and weak. Was responsive (made grunting noises) but not vocal about if he was in pain/would not provide answers to questions. Given change from prior mental status of AAOx3, rapid response was called.      Pt found to have rotary nystagmus. Continued to be weak, not following commands, and very agitated.  Stroke code was called.  Was given haldol 2mg IM; agitated improved and pt was minimally responsive to commands. Unable to obtain EKG given agitation and restlessness. Labs drawn and pt was sent down for CT scan.     Dr. Peters was present and the case was discussed with Dr. Mari. Around 2am pt was found to have /112. All other VSS. On exam pt was noted to be lethargic and weak. Was responsive (made grunting noises) but not vocal about if he was in pain/would not provide answers to questions. Given 5mg labetalol IVP. However given change from prior mental status of AAOx3, rapid response was called.      Pt found to have rotary nystagmus. Continued to be weak, not following commands, and very agitated.  Stroke code was called.  Was given haldol 2mg IM; agitated improved and pt was minimally responsive to commands. Unable to obtain EKG given agitation and restlessness. Labs drawn and pt was sent down for CT scan.     Dr. Peters was present and the case was discussed with Dr. Mari. Around 2am pt was found to have /112. All other VSS. On exam pt was noted to be lethargic and weak. Was responsive (made grunting noises) but not vocal about if he was in pain/would not provide answers to questions. Given 5mg labetalol IVP. However given change from prior mental status of AAOx3, rapid response was called.      Pt found to have rotary nystagmus. Continued to be weak, not following commands, and very agitated.  Stroke code was called.  Was given haldol 2mg IM; agitated improved and pt was minimally responsive to commands. Unable to obtain EKG given agitation and restlessness. Labs drawn and pt was sent down for CT scan. Dr. Peters was present and the case was discussed with Dr. Mari.    Labs notable for mild hyponatremia. CT brain without evidence of hemorrhage of acute infarct. ICU was consulted. Per ICU team, episode likely 2/2 benzodiazepine withdrawal, as pt was positive for benzo on admission, and improved with haldol during event. Recommending haldol PRN to treat withdrawal, and slow repletion of sodium and phosphorus. Around 2am pt was found to have /112. All other VSS. On exam pt was noted to be lethargic and weak. Was responsive (made grunting noises) but not vocal about if he was in pain/would not provide answers to questions. Given 5mg labetalol IVP. However given change from prior mental status of AAOx3, rapid response was called.      Pt found to have rotary nystagmus. Continued to be weak, not following commands, and very agitated.  Stroke code was called.  Was given haldol 2mg IM; agitated improved and pt was minimally responsive to commands. Unable to obtain EKG given agitation and restlessness. Labs drawn and pt was sent down for CT scan. Dr. Peters was present and the case was discussed with Dr. Mari.    Labs notable for mild hyponatremia. CT brain without evidence of hemorrhage of acute infarct. ICU was consulted. During evaluation pt was much improved, responsive, and following commands with no focal neurologic deficits. Per ICU team, episode likely 2/2 benzodiazepine withdrawal, as pt was positive for benzo on admission, and improved with haldol during event. Recommending haldol PRN to treat withdrawal, and slow repletion of sodium and phosphorus. Around 2am pt was found to have /112. All other VSS. On exam pt was noted to be lethargic and weak. Was responsive (made grunting noises) but not vocal about if he was in pain/would not provide answers to questions. Given 5mg labetalol IVP. However given change from prior mental status of AAOx3, rapid response was called.      Pt found to have rotary nystagmus. Continued to be weak, not following commands, and very agitated.  Stroke code was called.  Was given haldol 2mg IM; agitated improved and pt was minimally responsive to commands. Unable to obtain EKG given agitation and restlessness. Labs drawn and pt was sent down for CT scan. Dr. Peters was present and the case was discussed with Dr. Mari.    Labs notable for mild hyponatremia. CT brain without evidence of hemorrhage of acute infarct. After CT scan pt was much improved: AAOx3, responsive, and following commands with no focal neurologic deficits. ICU was consulted; rejected patient and provided recommendations for primary team. Around 2am pt was found to have /112. All other VSS. On exam pt was noted to be lethargic and weak. Was responsive (made grunting noises) but not vocal about if he was in pain/would not provide answers to questions. Given 5mg labetalol IVP. However given change from prior mental status of AAOx3, rapid response was called.      Pt found to have rotary nystagmus. Continued to be weak, not following commands, and very agitated.  Stroke code was called.  Was given haldol 2mg IM; agitated improved and pt was minimally responsive to commands. Unable to obtain EKG given agitation and restlessness. Labs drawn and pt was sent down for CT scan. Dr. Peters was present and the case was discussed with Dr. Mari.    Labs notable for mild hyponatremia. CT brain without evidence of hemorrhage of acute infarct. After CT scan pt was much improved: AAOx3, responsive, and following commands with no focal neurologic deficits. ICU was consulted; rejected patient and provided recommendations for primary team.    Episode likely 2/2 hypertensive emergency, given improvement with 5mg IV labetalol and improvement of pressures to SBP 150s.

## 2018-10-22 NOTE — CONSULT NOTE ADULT - ATTENDING COMMENTS
delirium related in context of xanax withdrdawal and renal failure, treated with haldol. currently cooperative, moving all 4 extremities to command with good strendth. no indication for tele/icu at this time

## 2018-10-22 NOTE — PROGRESS NOTE ADULT - SUBJECTIVE AND OBJECTIVE BOX
Neurology Stroke Progress Note    INTERVAL HPI/OVERNIGHT EVENTS:  Patient seen and examined.     MEDICATIONS  (STANDING):  amLODIPine   Tablet 10 milliGRAM(s) Oral daily  influenza   Vaccine 0.5 milliLiter(s) IntraMuscular once  labetalol 100 milliGRAM(s) Oral two times a day  latanoprost 0.005% Ophthalmic Solution 1 Drop(s) Both EYES at bedtime  losartan 100 milliGRAM(s) Oral daily  melatonin 5 milliGRAM(s) Oral at bedtime  timolol 0.25% Solution 1 Drop(s) Both EYES two times a day    MEDICATIONS  (PRN):  simethicone 80 milliGRAM(s) Chew four times a day PRN Gas      Allergies    No Known Allergies    Intolerances        ROS: As per HPI, otherwise negative    Vital Signs Last 24 Hrs  T(C): 36.6 (22 Oct 2018 16:00), Max: 37.3 (22 Oct 2018 05:50)  T(F): 97.8 (22 Oct 2018 16:00), Max: 99.2 (22 Oct 2018 05:50)  HR: 68 (22 Oct 2018 16:00) (59 - 76)  BP: 161/83 (22 Oct 2018 15:01) (161/83 - 204/94)  BP(mean): 107 (22 Oct 2018 11:44) (107 - 121)  RR: 18 (22 Oct 2018 16:00) (16 - 20)  SpO2: 98% (22 Oct 2018 16:00) (97% - 100%)    Physical exam:  General: awake and alert, sitting comfortably, no acute distress  CV: RRR, no murmurs  Pulm: CTA bilaterally  Neurologic:  Mental status: awake, alert, oriented to person, place, and time. Speech is fluent - able to name, repeat, and describe picture fully. Follows commands. Attention/concentration intact. No dysarthria, no aphasia.  Cranial nerves:   II: visual fields are full to confrontation. pupils equally round and reactive to light,   III, IV, VI: EOMI without nystagmus  V:  V1-V3 sensation intact,   VII: no facial droop, facie is symmetric with normal eye closure and smile  VIII: hearing is intact to finger rub  IX, X: Uvula is midline and soft palate rises symmetrically  XI: head turning and shoulder shrug are intact.  XII: tongue midline  Motor: Normal bulk and tone, strength 5/5 in b/l UE and LE,  strength 5/5. No pronator drift  Sensation: intactto light touch and pinprick. No neglect.  Coordination: No dysmetria on finger-to-nose and heel-to-shin  Reflexes: 2+ in upper and lower extremities, downgoing toes bilaterally  Gait: narrow and steady, no ataxia. Romberg negative        LABS:                        14.2   9.1   )-----------( 209      ( 22 Oct 2018 02:30 )             38.7     10-22    134<L>  |  100  |  11  ----------------------------<  122<H>  3.7   |  17<L>  |  1.17    Ca    9.0      22 Oct 2018 07:18  Phos  1.4     10-22  Mg     1.8     10-22    TPro  7.0  /  Alb  4.0  /  TBili  0.7  /  DBili  x   /  AST  16  /  ALT  8<L>  /  AlkPhos  71  10-22    PT/INR - ( 22 Oct 2018 02:30 )   PT: 13.2 sec;   INR: 1.19          PTT - ( 22 Oct 2018 02:30 )  PTT:32.3 sec      RADIOLOGY & ADDITIONAL TESTS:      Assessment and Plan  78y yo Male    1)Secondary stroke prevention  -ASA  -Statin    2) Stroke risk factors  -    3) Further workup     DVT prophylaxis   -Heparin SQ TID and SCDs Neurology Stroke Progress Note    INTERVAL HPI/OVERNIGHT EVENTS:  78M with PMH of HTN, depression/anxiety presented initially for altered mental status. Patient was at outpatient optometrist for which found to be somnolent. Overnight     MEDICATIONS  (STANDING):  amLODIPine   Tablet 10 milliGRAM(s) Oral daily  influenza   Vaccine 0.5 milliLiter(s) IntraMuscular once  labetalol 100 milliGRAM(s) Oral two times a day  latanoprost 0.005% Ophthalmic Solution 1 Drop(s) Both EYES at bedtime  losartan 100 milliGRAM(s) Oral daily  melatonin 5 milliGRAM(s) Oral at bedtime  timolol 0.25% Solution 1 Drop(s) Both EYES two times a day    MEDICATIONS  (PRN):  simethicone 80 milliGRAM(s) Chew four times a day PRN Gas      Allergies    No Known Allergies    Intolerances        ROS: As per HPI, otherwise negative    Vital Signs Last 24 Hrs  T(C): 36.6 (22 Oct 2018 16:00), Max: 37.3 (22 Oct 2018 05:50)  T(F): 97.8 (22 Oct 2018 16:00), Max: 99.2 (22 Oct 2018 05:50)  HR: 68 (22 Oct 2018 16:00) (59 - 76)  BP: 161/83 (22 Oct 2018 15:01) (161/83 - 204/94)  BP(mean): 107 (22 Oct 2018 11:44) (107 - 121)  RR: 18 (22 Oct 2018 16:00) (16 - 20)  SpO2: 98% (22 Oct 2018 16:00) (97% - 100%)    Physical exam:  General: awake and alert, sitting comfortably, no acute distress  CV: RRR, no murmurs  Pulm: CTA bilaterally  Neurologic:  Mental status: awake, alert, oriented to person, place, and time. Speech is fluent - able to name, repeat, and describe picture fully. Follows commands. Attention/concentration intact. No dysarthria, no aphasia.  Cranial nerves:   II: visual fields are full to confrontation. pupils equally round and reactive to light,   III, IV, VI: EOMI without nystagmus  V:  V1-V3 sensation intact,   VII: no facial droop, facie is symmetric with normal eye closure and smile  VIII: hearing is intact to finger rub  IX, X: Uvula is midline and soft palate rises symmetrically  XI: head turning and shoulder shrug are intact.  XII: tongue midline  Motor: Normal bulk and tone, strength 5/5 in b/l UE and LE,  strength 5/5. No pronator drift  Sensation: intactto light touch and pinprick. No neglect.  Coordination: No dysmetria on finger-to-nose and heel-to-shin  Reflexes: 2+ in upper and lower extremities, downgoing toes bilaterally  Gait: narrow and steady, no ataxia. Romberg negative        LABS:                        14.2   9.1   )-----------( 209      ( 22 Oct 2018 02:30 )             38.7     10-22    134<L>  |  100  |  11  ----------------------------<  122<H>  3.7   |  17<L>  |  1.17    Ca    9.0      22 Oct 2018 07:18  Phos  1.4     10-22  Mg     1.8     10-22    TPro  7.0  /  Alb  4.0  /  TBili  0.7  /  DBili  x   /  AST  16  /  ALT  8<L>  /  AlkPhos  71  10-22    PT/INR - ( 22 Oct 2018 02:30 )   PT: 13.2 sec;   INR: 1.19          PTT - ( 22 Oct 2018 02:30 )  PTT:32.3 sec      RADIOLOGY & ADDITIONAL TESTS:      Assessment and Plan  78y yo Male    1)Secondary stroke prevention  -ASA  -Statin    2) Stroke risk factors  -    3) Further workup     DVT prophylaxis   -Heparin SQ TID and SCDs Neurology Stroke Progress Note    INTERVAL HPI/OVERNIGHT EVENTS:  78M with PMH of HTN, depression/anxiety presented initially for altered mental status. Patient was at outpatient optometrist for which found to be somnolent. Overnight found to be altered - not answering questions. CT done. Today Dr. Vyas looked at CT and looks like a subacute infarct in R side.    Today, pt states he doesn't feel great, somewhat cooperative with exam but does not feel well    MEDICATIONS  (STANDING):  amLODIPine   Tablet 10 milliGRAM(s) Oral daily  influenza   Vaccine 0.5 milliLiter(s) IntraMuscular once  labetalol 100 milliGRAM(s) Oral two times a day  latanoprost 0.005% Ophthalmic Solution 1 Drop(s) Both EYES at bedtime  losartan 100 milliGRAM(s) Oral daily  melatonin 5 milliGRAM(s) Oral at bedtime  timolol 0.25% Solution 1 Drop(s) Both EYES two times a day    MEDICATIONS  (PRN):  simethicone 80 milliGRAM(s) Chew four times a day PRN Gas      Allergies    No Known Allergies        ROS: As per HPI, otherwise negative    Vital Signs Last 24 Hrs  T(C): 36.6 (22 Oct 2018 16:00), Max: 37.3 (22 Oct 2018 05:50)  T(F): 97.8 (22 Oct 2018 16:00), Max: 99.2 (22 Oct 2018 05:50)  HR: 68 (22 Oct 2018 16:00) (59 - 76)  BP: 161/83 (22 Oct 2018 15:01) (161/83 - 204/94)  BP(mean): 107 (22 Oct 2018 11:44) (107 - 121)  RR: 18 (22 Oct 2018 16:00) (16 - 20)  SpO2: 98% (22 Oct 2018 16:00) (97% - 100%)    Physical exam:  General: sleeping on left side  Neurologic:  Mental status: awake, alert, oriented to person. Not answering many questions. No dysarthria.  Cranial nerves:   II: doesn't keep eyes open to test visual fields  III, IV, VI: EOMI without nystagmus  V:  V1-V3 sensation intact,   VII: no facial droop  VIII: hearing is intact to finger rub  IX, X: Uvula is midline and soft palate rises symmetrically  XII: tongue midline  Motor: Left pronator drift  Sensation: not cooperating  Coordination: not cooperating  Reflexes: 2+ in upper and lower extremities, downgoing toes bilaterally  Gait: deferred        LABS:                        14.2   9.1   )-----------( 209      ( 22 Oct 2018 02:30 )             38.7     10-22    134<L>  |  100  |  11  ----------------------------<  122<H>  3.7   |  17<L>  |  1.17    Ca    9.0      22 Oct 2018 07:18  Phos  1.4     10-22  Mg     1.8     10-22    TPro  7.0  /  Alb  4.0  /  TBili  0.7  /  DBili  x   /  AST  16  /  ALT  8<L>  /  AlkPhos  71  10-22    PT/INR - ( 22 Oct 2018 02:30 )   PT: 13.2 sec;   INR: 1.19          PTT - ( 22 Oct 2018 02:30 )  PTT:32.3 sec      RADIOLOGY & ADDITIONAL TESTS:        Assessment and Plan  78y yo Male poor historian, with a PMHx HTN, depression and anxiety with toxic metabolic encephalopathy, acute renal failure, hypokalemia and hyponatremia, now with subacute stroke seen on CT    1)Secondary stroke prevention  -Please start Aspirin and plavix  -Start Atorvastatin 80    2) Further workup   -MRI  -TERI    DVT prophylaxis   -Heparin SQ TID and SCDs

## 2018-10-22 NOTE — PROGRESS NOTE ADULT - SUBJECTIVE AND OBJECTIVE BOX
OVERNIGHT EVENTS: Per chart note: Around 2am pt was found to have /112. All other VSS. On exam pt was noted to be lethargic and weak. Was responsive (made grunting noises) but not vocal about if he was in pain/would not provide answers to questions. Given 5mg labetalol IVP. However given change from prior mental status of AAOx3, rapid response was called.  Pt found to have rotary nystagmus. Continued to be weak, not following commands, and very agitated.  Stroke code was called.  Was given haldol 2mg IM; agitated improved and pt was minimally responsive to commands. Unable to obtain EKG given agitation and restlessness. Labs drawn and pt was sent down for CT scan. Dr. Peters was present and the case was discussed with Dr. Mari.Labs notable for mild hyponatremia. CT brain without evidence of hemorrhage of acute infarct. After CT scan pt was much improved: AAOx3, responsive, and following commands with no focal neurologic deficits. ICU was consulted; rejected patient and provided recommendations for primary team. Episode likely 2/2 hypertensive emergency, given improvement with 5mg IV labetalol and improvement of pressures to SBP 150s.      SUBJECTIVE / INTERVAL HPI: Patient seen and examined at bedside. Pt states he feels out of it. He is unable to articulate how he feels out of it and has one word responses. Says he no longer has diarrhea.  Denies f/c, headache, dizziness, CP, SOB, abd pain, n/v, d/c, dysuria/hematuria.     VITAL SIGNS:  Vital Signs Last 24 Hrs  T(C): 36.6 (22 Oct 2018 16:00), Max: 37.3 (22 Oct 2018 05:50)  T(F): 97.8 (22 Oct 2018 16:00), Max: 99.2 (22 Oct 2018 05:50)  HR: 68 (22 Oct 2018 16:00) (59 - 76)  BP: 161/83 (22 Oct 2018 15:01) (161/83 - 204/94)  BP(mean): 107 (22 Oct 2018 11:44) (107 - 121)  RR: 18 (22 Oct 2018 16:00) (16 - 20)  SpO2: 98% (22 Oct 2018 16:00) (97% - 100%)    PHYSICAL EXAM:    General: NAD, resting comfortably in bed  HEENT: NCAT; PERRL, anicteric sclera  Neck: supple, trachea midline  Cardiovascular: S1, S2 normal; RRR, no M/G/R  Respiratory: CTABL; no W/R/R  Gastrointestinal: soft, nontender, nondistended. bowel sounds present.  Skin: no ulcerations or visible rashes appreciated  Extremities: WWP; no edema, clubbing or cyanosis  Vascular: 2+ radial, DP/PT pulses B/L  Neurological: AAOx2; no focal deficits    MEDICATIONS:  MEDICATIONS  (STANDING):  amLODIPine   Tablet 10 milliGRAM(s) Oral daily  hydrALAZINE 25 milliGRAM(s) Oral every 8 hours  influenza   Vaccine 0.5 milliLiter(s) IntraMuscular once  labetalol 100 milliGRAM(s) Oral two times a day  latanoprost 0.005% Ophthalmic Solution 1 Drop(s) Both EYES at bedtime  losartan 100 milliGRAM(s) Oral daily  melatonin 5 milliGRAM(s) Oral at bedtime  timolol 0.25% Solution 1 Drop(s) Both EYES two times a day    MEDICATIONS  (PRN):  simethicone 80 milliGRAM(s) Chew four times a day PRN Gas      ALLERGIES:  Allergies    No Known Allergies    Intolerances        LABS:                        14.2   9.1   )-----------( 209      ( 22 Oct 2018 02:30 )             38.7     10-22    134<L>  |  100  |  11  ----------------------------<  122<H>  3.7   |  17<L>  |  1.17    Ca    9.0      22 Oct 2018 07:18  Phos  1.4     10-22  Mg     1.8     10-22    TPro  7.0  /  Alb  4.0  /  TBili  0.7  /  DBili  x   /  AST  16  /  ALT  8<L>  /  AlkPhos  71  10-22    PT/INR - ( 22 Oct 2018 02:30 )   PT: 13.2 sec;   INR: 1.19          PTT - ( 22 Oct 2018 02:30 )  PTT:32.3 sec    CAPILLARY BLOOD GLUCOSE      POCT Blood Glucose.: 134 mg/dL (22 Oct 2018 02:01)      RADIOLOGY & ADDITIONAL TESTS: Reviewed.

## 2018-10-22 NOTE — PROGRESS NOTE ADULT - PROBLEM SELECTOR PLAN 4
K 2.2 on admission. persistent hypokalemia. Likely 2/2 diarrhea.  pt initially stated he hasn't had BM in 2-3 days but on further questioning stated he has also had diarrhea. Received 110 meq prior to arrival  -K improved 3.7 10/22  -no EKG changes.  -f/u urine anion gap  -trend BMP and Mg

## 2018-10-22 NOTE — CONSULT NOTE ADULT - SUBJECTIVE AND OBJECTIVE BOX
CONSULT NOTE  HPI:   78M with PMH of HTN, depression/anxiety presented initially for altered mental status. Patient was at outpatient optometrist for which found to be somnoluent- Mobile stroke unit imaging had demonstrated no evidence of CVA for which patient brought into [facility] and admitted to F at [facility] for acute metabolic encephalopathy with a noted acute renal failure, hypokalemia, hyponatremia. ARF  had been attributed to a pre-renal process- KHOA likely 2/2 to diarrhea. Patient being medically managed on the [facility] and noted to be awake and oriented x3. Last known normal was at 2AM as per nursing staff. Patient noted to have acute change in mental status -for which patient agitated and screaming incoherently. BP noted to be 217/112, HR in 60-70s. Patient given 5mg IVP of labetolol and rapid response called for change in mental status. Noted to have rotary nystagmus on exam, no other focal deficits. Patient remained agitated for which haldol 2mg IM given. Stroke code was called for change in mental status and nystagmus in setting of hypertensive emergency. Patient     OBJECTIVE  Vitals:  T(C): 36.2 (10-22-18 @ 01:46), Max: 36.9 (10-21-18 @ 04:48)  HR: 76 (10-22-18 @ 01:46) (67 - 76)  BP: 204/94 (10-22-18 @ 01:46) (167/98 - 204/94)  RR: 20 (10-22-18 @ 01:46) (16 - 20)  SpO2: 100% (10-22-18 @ 01:46) (98% - 100%)  Wt(kg): --    I/O:  I&O's Summary    20 Oct 2018 07:01  -  21 Oct 2018 07:00  --------------------------------------------------------  IN: 700 mL / OUT: 0 mL / NET: 700 mL    21 Oct 2018 07:01  -  22 Oct 2018 04:42  --------------------------------------------------------  IN: 2100 mL / OUT: 2000 mL / NET: 100 mL        PHYSICAL EXAM:  Appearance: Mild agitation, no purposeful movements, speaking fragmented sentences.   HEENT: PERRL. No pallor noted.  Conjunctiva clear b/l.   Cardiovascular: RRR with no murmurs.  Gastrointestinal:  Soft, nontender. Non-distended. Non-rigid.	  Extremities: No edema b/l. No erythema b/l. LE WWP b/l. DP present.  Neuro: Awake and oriented x3. Intermittently following commands.   Noted expressive aphasia- fragmented sentences but no noted dysarthria.   Cranial nerves: Pupils equally round and reactive to light, visual fields full, no nystagmus, extraocular muscles intact, V1 through V3 intact bilaterally and symmetric, no facial droop, palate elevation symmetric, tongue was midline, sternocleidomastoid/shoulder shrug strength bilaterally 5/5.    Motor:  No pronator drift. Normal bulk and tone, strength 5/5 in bilateral upper and lower extremities.    Sensation: Intact to light touch, proprioception.  No neglect.   Coordination: No dysmetria on finger-to-nose and heel-to-shin.  Reflexes: 2+ in upper and lower extremities.        	  LABS:                        13.3   8.7   )-----------( 214      ( 21 Oct 2018 07:24 )             35.8     10-22    133<L>  |  101  |  13  ----------------------------<  136<H>  3.6   |  14<L>  |  1.17    Ca    9.0      22 Oct 2018 02:30  Phos  1.6     10-21  Mg     2.0     10-21    TPro  7.0  /  Alb  4.0  /  TBili  0.7  /  DBili  x   /  AST  16  /  ALT  8<L>  /  AlkPhos  71  10-22    PT/INR - ( 22 Oct 2018 02:30 )   PT: 13.2 sec;   INR: 1.19          PTT - ( 22 Oct 2018 02:30 )  PTT:32.3 sec      RADIOLOGY & ADDITIONAL TESTS:  Reviewed .    MEDICATIONS  (STANDING):  amLODIPine   Tablet 10 milliGRAM(s) Oral daily  hydrALAZINE 25 milliGRAM(s) Oral every 8 hours  influenza   Vaccine 0.5 milliLiter(s) IntraMuscular once  lactated ringers. 1000 milliLiter(s) (150 mL/Hr) IV Continuous <Continuous>  latanoprost 0.005% Ophthalmic Solution 1 Drop(s) Both EYES at bedtime  melatonin 5 milliGRAM(s) Oral at bedtime  timolol 0.25% Solution 1 Drop(s) Both EYES two times a day    MEDICATIONS  (PRN):  simethicone 80 milliGRAM(s) Chew four times a day PRN Gas

## 2018-10-22 NOTE — CONSULT NOTE ADULT - SUBJECTIVE AND OBJECTIVE BOX
CRITICAL CARE SERVICE INITIAL CONSULT NOTE    HPI:  Pt is a 77 yo M, poor historian on presentation, with a PMHx HTN, depression and anxiety (treated with xanax) who was brought by EMS to [facility] after an episode at his optometrist's office in which he slouched over and was somnolent. Imaging on mobile stroke unit showed no CVA, and further workup showed acute renal failure, hypokalemia, hyponatremia, and toxic metabolic encephelopathy suspected to be secondary to diarrhea and pre-renal KHOA. Patient was AOx3 during the day, and with resolving KHOA and TME. Then, overnight patient was acutely altered, screaming incoherently, and found to be hypertensive to SBP>200. Stroke code was called, and CT/CTA negative for acute stroke. Haldol 2mg IM given for delerium. Critical care consulted for AMS and hypertension. Patient was seen and examined during initial stroke code, while hypertensive, and after return from CT scan. Patient was initially agitated and unable to follow commands or answer questions appropriately. After haldol given, patient became more cooperative, intermittently sleeping but easily awakened, and no longer hypertensive. Patient denies pain, feels     REVIEW OF SYSTEMS:   Otherwise negative except as specified in HPI    PAST MEDICAL HISTORY:     PAST SURGICAL HISTORY:    FAMILY HISTORY:    SOCIAL HISTORY:  Tobacco use:  EtOH use:  Illicit drug use:    MEDICATIONS:  MEDICATIONS  (STANDING):  amLODIPine   Tablet 10 milliGRAM(s) Oral daily  hydrALAZINE 25 milliGRAM(s) Oral every 8 hours  influenza   Vaccine 0.5 milliLiter(s) IntraMuscular once  lactated ringers. 1000 milliLiter(s) (150 mL/Hr) IV Continuous <Continuous>  latanoprost 0.005% Ophthalmic Solution 1 Drop(s) Both EYES at bedtime  melatonin 5 milliGRAM(s) Oral at bedtime  timolol 0.25% Solution 1 Drop(s) Both EYES two times a day    MEDICATIONS  (PRN):  simethicone 80 milliGRAM(s) Chew four times a day PRN Gas      ALLERGIES:  Allergies    No Known Allergies    Intolerances        VITAL SIGNS:  Vital Signs Last 24 Hrs  T(C): 36.2 (22 Oct 2018 01:46), Max: 36.9 (21 Oct 2018 04:48)  T(F): 97.2 (22 Oct 2018 01:46), Max: 98.5 (21 Oct 2018 04:48)  HR: 76 (22 Oct 2018 01:46) (67 - 76)  BP: 204/94 (22 Oct 2018 01:46) (167/98 - 204/94)  BP(mean): --  RR: 20 (22 Oct 2018 01:46) (16 - 20)  SpO2: 100% (22 Oct 2018 01:46) (98% - 100%)    10-20-18 @ 07:01  -  10-21-18 @ 07:00  --------------------------------------------------------  IN:    lactated ringers.: 600 mL    Solution: 100 mL  Total IN: 700 mL    OUT:  Total OUT: 0 mL    Total NET: 700 mL      10-21-18 @ 07:01  -  10-22-18 @ 03:57  --------------------------------------------------------  IN:    lactated ringers.: 2100 mL  Total IN: 2100 mL    OUT:    Voided: 2000 mL  Total OUT: 2000 mL    Total NET: 100 mL          PHYSICAL EXAM:  Constitutional: WDWN resting comfortably in bed; NAD  Head: NC/AT  Eyes: PERRL, EOMI, anicteric sclera  ENT: no nasal discharge; uvula midline, no oropharyngeal erythema or exudates; MMM  Neck: supple; no JVD or thyromegaly  Respiratory: CTA B/L; no W/R/R, no retractions  Cardiac: +S1/S2; RRR; no M/R/G; PMI non-displaced  Gastrointestinal: abdomen soft, NT/ND; no rebound or guarding; +BSx4  Genitourinary: normal external genitalia  Back: spine midline, no bony tenderness or step-offs; no CVAT B/L  Extremities: WWP, no clubbing or cyanosis; no peripheral edema  Musculoskeletal: NROM x4; no joint swelling, tenderness or erythema  Vascular: 2+ radial, femoral, DP/PT pulses B/L  Dermatologic: skin warm, dry and intact; no rashes, wounds, or scars  Lymphatic: no submandibular or cervical LAD  Neurologic: AAOx3; CNII-XII grossly intact; no focal deficits  Psychiatric: affect and characteristics of appearance, verbalizations, behaviors are appropriate    LABS:                        13.3   8.7   )-----------( 214      ( 21 Oct 2018 07:24 )             35.8     10-22    133<L>  |  101  |  x   ----------------------------<  136<H>  3.6   |  14<L>  |  1.17    Ca    9.0      22 Oct 2018 02:30  Phos  1.6     10-21  Mg     2.0     10-21    TPro  7.0  /  Alb  4.0  /  TBili  0.7  /  DBili  x   /  AST  16  /  ALT  8<L>  /  AlkPhos  71  10-22    PT/INR - ( 22 Oct 2018 02:30 )   PT: 13.2 sec;   INR: 1.19          PTT - ( 22 Oct 2018 02:30 )  PTT:32.3 sec    CARDIAC MARKERS ( 22 Oct 2018 02:30 )  x     / <0.01 ng/mL / 76 U/L / x     / 5.6 ng/mL      CAPILLARY BLOOD GLUCOSE      POCT Blood Glucose.: 134 mg/dL (22 Oct 2018 02:01)          RADIOLOGY & ADDITIONAL TESTS: Reviewed. CRITICAL CARE SERVICE INITIAL CONSULT NOTE    HPI:  Pt is a 77 yo M, poor historian on presentation, with a PMHx HTN, depression and anxiety (treated with xanax) who was brought by EMS to [facility] after an episode at his optometrist's office in which he slouched over and was somnolent. Imaging on mobile stroke unit showed no CVA, and further workup showed acute renal failure, hypokalemia, hyponatremia, and toxic metabolic encephelopathy suspected to be secondary to diarrhea and pre-renal KHOA. Patient was AOx3 during the day, and with resolving KHOA and TME. Then, overnight patient was acutely altered, screaming incoherently, and found to be hypertensive to SBP>200. Stroke code was called, and CT/CTA negative for acute stroke. Haldol 2mg IM given for delerium. Critical care consulted for AMS and hypertension. Patient was seen and examined during initial stroke code, while hypertensive, and after return from CT scan. Patient was initially agitated and unable to follow commands or answer questions appropriately. After haldol given, patient became more cooperative, intermittently sleeping but easily awakened, and no longer hypertensive. Patient denies pain, feels better at this time.     REVIEW OF SYSTEMS:   Otherwise negative except as specified in HPI    PAST MEDICAL HISTORY: As above    PAST SURGICAL HISTORY: No pertinent positives in history    FAMILY HISTORY: No pertinent positives in history    SOCIAL HISTORY:  Tobacco use: Denies  EtOH use: Denies  Illicit drug use: Benzodiazepines as in HPI    MEDICATIONS:  MEDICATIONS  (STANDING):  amLODIPine   Tablet 10 milliGRAM(s) Oral daily  hydrALAZINE 25 milliGRAM(s) Oral every 8 hours  influenza   Vaccine 0.5 milliLiter(s) IntraMuscular once  lactated ringers. 1000 milliLiter(s) (150 mL/Hr) IV Continuous <Continuous>  latanoprost 0.005% Ophthalmic Solution 1 Drop(s) Both EYES at bedtime  melatonin 5 milliGRAM(s) Oral at bedtime  timolol 0.25% Solution 1 Drop(s) Both EYES two times a day    MEDICATIONS  (PRN):  simethicone 80 milliGRAM(s) Chew four times a day PRN Gas      ALLERGIES:  Allergies    No Known Allergies    Intolerances        VITAL SIGNS:  Vital Signs Last 24 Hrs  T(C): 36.2 (22 Oct 2018 01:46), Max: 36.9 (21 Oct 2018 04:48)  T(F): 97.2 (22 Oct 2018 01:46), Max: 98.5 (21 Oct 2018 04:48)  HR: 76 (22 Oct 2018 01:46) (67 - 76)  BP: 204/94 (22 Oct 2018 01:46) (167/98 - 204/94)  BP(mean): --  RR: 20 (22 Oct 2018 01:46) (16 - 20)  SpO2: 100% (22 Oct 2018 01:46) (98% - 100%)    10-20-18 @ 07:01  -  10-21-18 @ 07:00  --------------------------------------------------------  IN:    lactated ringers.: 600 mL    Solution: 100 mL  Total IN: 700 mL    OUT:  Total OUT: 0 mL    Total NET: 700 mL      10-21-18 @ 07:01  -  10-22-18 @ 03:57  --------------------------------------------------------  IN:    lactated ringers.: 2100 mL  Total IN: 2100 mL    OUT:    Voided: 2000 mL  Total OUT: 2000 mL    Total NET: 100 mL          PHYSICAL EXAM:  Constitutional: Patient now resting comfortably in bed; NAD  Head: NC/AT  Eyes: PERRL, EOMI, anicteric sclera  ENT: no nasal discharge; uvula midline, no oropharyngeal erythema or exudates; MMM  Neck: supple; no JVD or thyromegaly  Respiratory: CTA B/L; no W/R/R, no retractions  Cardiac: +S1/S2; RRR; no M/R/G; PMI non-displaced  Gastrointestinal: abdomen soft, NT/ND; no rebound or guarding; +BSx4  Genitourinary: normal external genitalia  Extremities: WWP, no clubbing or cyanosis; no peripheral edema  Dermatologic: skin warm, dry and intact; no rashes, wounds, or scars  Neurologic: AAOx2-3 after reorientation; CNII-XII grossly intact; no focal deficits    LABS:                        13.3   8.7   )-----------( 214      ( 21 Oct 2018 07:24 )             35.8     10-22    133<L>  |  101  |  x   ----------------------------<  136<H>  3.6   |  14<L>  |  1.17    Ca    9.0      22 Oct 2018 02:30  Phos  1.6     10-21  Mg     2.0     10-21    TPro  7.0  /  Alb  4.0  /  TBili  0.7  /  DBili  x   /  AST  16  /  ALT  8<L>  /  AlkPhos  71  10-22    PT/INR - ( 22 Oct 2018 02:30 )   PT: 13.2 sec;   INR: 1.19          PTT - ( 22 Oct 2018 02:30 )  PTT:32.3 sec    CARDIAC MARKERS ( 22 Oct 2018 02:30 )  x     / <0.01 ng/mL / 76 U/L / x     / 5.6 ng/mL      CAPILLARY BLOOD GLUCOSE      POCT Blood Glucose.: 134 mg/dL (22 Oct 2018 02:01)          RADIOLOGY & ADDITIONAL TESTS: Reviewed.

## 2018-10-22 NOTE — PROGRESS NOTE ADULT - PROBLEM SELECTOR PLAN 5
Patient with delayed responses. oriented x 3 but has difficulty remembering things such as medications, PMHx. Likely 2/2 Xanax vs hyponatremia, dehydration.  -No e/o infection aside from leukocytosis (normal diff, downtrending).   -B12, TSH, RPR  -avoid sedating medications

## 2018-10-22 NOTE — PROGRESS NOTE ADULT - PROBLEM SELECTOR PLAN 1
Around 2am 10/22 pt was found to have /112. All other VSS. On exam pt was noted to be lethargic and weak. s/p 5mg labetalol IVP. However given change from prior mental status of AAOx3, rapid response was called.  Pt found to have rotary nystagmus. Continued to be weak, not following commands, and very agitated.  Stroke code was called.  Was given haldol 2mg IM; agitated improved and pt was minimally responsive to commands. Unable to obtain EKG given agitation and restlessness. Labs drawn and pt was sent down for CT scan. Dr. Peters was present and the case was discussed with Dr. Mari. Labs notable for mild hyponatremia. CT brain without evidence of hemorrhage of acute infarct. After CT scan pt was much improved: AAOx3, responsive, and following commands with no focal neurologic deficits. ICU was consulted; rejected patient and provided recommendations for primary team. Episode likely 2/2 hypertensive emergency, given improvement with 5mg IV labetalol and improvement of pressures to SBP 150s.  -started labetalol 100mg PO bid  -started losartan 100mg PO qd  -c/w hydralazine 25mg PO q8h  -c/w amlodipine 10mg PO qd  -strict monitoring of BP to 161/83  -BP now improved to

## 2018-10-22 NOTE — CONSULT NOTE ADULT - ASSESSMENT
ASSESSMENT:  78M with PMH of HTN, HLD presenting for acute toxic metabolic encephalopathy attributed to acute renal failure with electrolyte abnormalities attributed to diarrhea/pre-renal process. While undergoing medical management on the floor Rapid response called for acute change in mental status. Low suspicion for stroke at this time, as patient has no focal deficits and imaging negative for acute event.     PLAN:  -Low suspicion for acute stroke at this time- patient has no focal deficits. NIHSS 2 for expressive aphasia- fragmented sentences- but no dysarthria and CTA and CTH neg.  -Continue with blood pressure management  -Recommend further medical management under medicine service. Reconsult for any changes in mental status.
Pt is a 77 yo M, poor historian on presentation, with a PMHx HTN, depression and anxiety (treated with xanax) who was brought by EMS to [facility] after an episode at his optometrist's office in which he slouched over and was somnolent, consulted for acute delirium and HTN which has now resolved after haldol     #AMS  - CT brain without evidence of hemorrhage of acute infarct.  - Initially concern for HTN emergency given AMS with hypertension and given IV labetalol 5mg and 2mg IM Haldol, resolved now, so likely delirium causing hypertension due to struggling instead. Patient now calm and responsive. There is concern that this may be due to benzodiazepine withdrawal given patient positive for benzos in initial urine toxicology, however now resolved with haldol, so would continue to treat with haldol.   - Continue to treat HTN  - Continue to monitor mental status and treat with haldol as needed for delirium, avoid benzos  - Recommend continued care and management on regional medical floor    Patient seen and examined at bedside with attending. Recommendations communicated with primary team

## 2018-10-22 NOTE — PROVIDER CONTACT NOTE (OTHER) - SITUATION
Upon answering to pt bed alarm, Pt voiced " I feel bad" and wouldn't response to any further assessment questions. when asked any pain, pt pointed to L upper chest area.

## 2018-10-23 DIAGNOSIS — E87.2 ACIDOSIS: ICD-10-CM

## 2018-10-23 DIAGNOSIS — I63.9 CEREBRAL INFARCTION, UNSPECIFIED: ICD-10-CM

## 2018-10-23 DIAGNOSIS — F41.9 ANXIETY DISORDER, UNSPECIFIED: ICD-10-CM

## 2018-10-23 LAB
ANION GAP SERPL CALC-SCNC: 19 MMOL/L — HIGH (ref 5–17)
B-OH-BUTYR SERPL-SCNC: 1.9 MMOL/L — HIGH
BUN SERPL-MCNC: 13 MG/DL — SIGNIFICANT CHANGE UP (ref 7–23)
CALCIUM SERPL-MCNC: 9 MG/DL — SIGNIFICANT CHANGE UP (ref 8.4–10.5)
CHLORIDE SERPL-SCNC: 100 MMOL/L — SIGNIFICANT CHANGE UP (ref 96–108)
CHOLEST SERPL-MCNC: 165 MG/DL — SIGNIFICANT CHANGE UP (ref 10–199)
CO2 SERPL-SCNC: 17 MMOL/L — LOW (ref 22–31)
CREAT SERPL-MCNC: 1.29 MG/DL — SIGNIFICANT CHANGE UP (ref 0.5–1.3)
GLUCOSE SERPL-MCNC: 121 MG/DL — HIGH (ref 70–99)
HCT VFR BLD CALC: 38.6 % — LOW (ref 39–50)
HDLC SERPL-MCNC: 34 MG/DL — LOW
HGB BLD-MCNC: 14.1 G/DL — SIGNIFICANT CHANGE UP (ref 13–17)
LACTATE SERPL-SCNC: 1.3 MMOL/L — SIGNIFICANT CHANGE UP (ref 0.5–2)
LIPID PNL WITH DIRECT LDL SERPL: 107 MG/DL — SIGNIFICANT CHANGE UP
MAGNESIUM SERPL-MCNC: 2 MG/DL — SIGNIFICANT CHANGE UP (ref 1.6–2.6)
MCHC RBC-ENTMCNC: 31.2 PG — SIGNIFICANT CHANGE UP (ref 27–34)
MCHC RBC-ENTMCNC: 36.5 G/DL — HIGH (ref 32–36)
MCV RBC AUTO: 85.4 FL — SIGNIFICANT CHANGE UP (ref 80–100)
PLATELET # BLD AUTO: 221 K/UL — SIGNIFICANT CHANGE UP (ref 150–400)
POTASSIUM SERPL-MCNC: 3.4 MMOL/L — LOW (ref 3.5–5.3)
POTASSIUM SERPL-SCNC: 3.4 MMOL/L — LOW (ref 3.5–5.3)
RBC # BLD: 4.52 M/UL — SIGNIFICANT CHANGE UP (ref 4.2–5.8)
RBC # FLD: 12.8 % — SIGNIFICANT CHANGE UP (ref 10.3–16.9)
SODIUM SERPL-SCNC: 136 MMOL/L — SIGNIFICANT CHANGE UP (ref 135–145)
TOTAL CHOLESTEROL/HDL RATIO MEASUREMENT: 4.9 RATIO — SIGNIFICANT CHANGE UP (ref 3.4–9.6)
TRIGL SERPL-MCNC: 121 MG/DL — SIGNIFICANT CHANGE UP (ref 10–149)
WBC # BLD: 8.9 K/UL — SIGNIFICANT CHANGE UP (ref 3.8–10.5)
WBC # FLD AUTO: 8.9 K/UL — SIGNIFICANT CHANGE UP (ref 3.8–10.5)

## 2018-10-23 PROCEDURE — 99222 1ST HOSP IP/OBS MODERATE 55: CPT

## 2018-10-23 PROCEDURE — 93306 TTE W/DOPPLER COMPLETE: CPT | Mod: 26

## 2018-10-23 PROCEDURE — 99233 SBSQ HOSP IP/OBS HIGH 50: CPT | Mod: GC

## 2018-10-23 PROCEDURE — 99233 SBSQ HOSP IP/OBS HIGH 50: CPT

## 2018-10-23 RX ORDER — LABETALOL HCL 100 MG
200 TABLET ORAL
Qty: 0 | Refills: 0 | Status: DISCONTINUED | OUTPATIENT
Start: 2018-10-23 | End: 2018-10-23

## 2018-10-23 RX ORDER — SODIUM CHLORIDE 9 MG/ML
1000 INJECTION, SOLUTION INTRAVENOUS
Qty: 0 | Refills: 0 | Status: DISCONTINUED | OUTPATIENT
Start: 2018-10-23 | End: 2018-10-23

## 2018-10-23 RX ORDER — POTASSIUM CHLORIDE 20 MEQ
40 PACKET (EA) ORAL ONCE
Qty: 0 | Refills: 0 | Status: COMPLETED | OUTPATIENT
Start: 2018-10-23 | End: 2018-10-23

## 2018-10-23 RX ORDER — POTASSIUM CHLORIDE 20 MEQ
20 PACKET (EA) ORAL ONCE
Qty: 0 | Refills: 0 | Status: COMPLETED | OUTPATIENT
Start: 2018-10-23 | End: 2018-10-23

## 2018-10-23 RX ORDER — LABETALOL HCL 100 MG
200 TABLET ORAL
Qty: 0 | Refills: 0 | Status: DISCONTINUED | OUTPATIENT
Start: 2018-10-23 | End: 2018-10-26

## 2018-10-23 RX ORDER — SODIUM CHLORIDE 9 MG/ML
1000 INJECTION, SOLUTION INTRAVENOUS
Qty: 0 | Refills: 0 | Status: DISCONTINUED | OUTPATIENT
Start: 2018-10-23 | End: 2018-10-26

## 2018-10-23 RX ADMIN — LATANOPROST 1 DROP(S): 0.05 SOLUTION/ DROPS OPHTHALMIC; TOPICAL at 22:09

## 2018-10-23 RX ADMIN — HEPARIN SODIUM 5000 UNIT(S): 5000 INJECTION INTRAVENOUS; SUBCUTANEOUS at 14:07

## 2018-10-23 RX ADMIN — Medication 1 DROP(S): at 06:24

## 2018-10-23 RX ADMIN — SODIUM CHLORIDE 70 MILLILITER(S): 9 INJECTION, SOLUTION INTRAVENOUS at 19:05

## 2018-10-23 RX ADMIN — Medication 200 MILLIGRAM(S): at 16:49

## 2018-10-23 RX ADMIN — AMLODIPINE BESYLATE 10 MILLIGRAM(S): 2.5 TABLET ORAL at 06:24

## 2018-10-23 RX ADMIN — Medication 1 DROP(S): at 16:50

## 2018-10-23 RX ADMIN — HEPARIN SODIUM 5000 UNIT(S): 5000 INJECTION INTRAVENOUS; SUBCUTANEOUS at 22:08

## 2018-10-23 RX ADMIN — Medication 81 MILLIGRAM(S): at 09:54

## 2018-10-23 RX ADMIN — HEPARIN SODIUM 5000 UNIT(S): 5000 INJECTION INTRAVENOUS; SUBCUTANEOUS at 06:24

## 2018-10-23 RX ADMIN — Medication 40 MILLIEQUIVALENT(S): at 09:55

## 2018-10-23 RX ADMIN — ATORVASTATIN CALCIUM 80 MILLIGRAM(S): 80 TABLET, FILM COATED ORAL at 22:08

## 2018-10-23 RX ADMIN — Medication 5 MILLIGRAM(S): at 22:08

## 2018-10-23 RX ADMIN — Medication 100 MILLIGRAM(S): at 06:24

## 2018-10-23 RX ADMIN — LOSARTAN POTASSIUM 100 MILLIGRAM(S): 100 TABLET, FILM COATED ORAL at 06:24

## 2018-10-23 RX ADMIN — CLOPIDOGREL BISULFATE 75 MILLIGRAM(S): 75 TABLET, FILM COATED ORAL at 09:54

## 2018-10-23 NOTE — BEHAVIORAL HEALTH ASSESSMENT NOTE - NSBHREFERDETAILS_PSY_A_CORE_FT
78-year-old male with known hx of benzo rx, admitted on 10/19/18 with AMS s/p collapsing in optometrist's office. Found to have KHOA, hypokalemia, increased creatinine. Stroke code and rapid response called yesterday. Pt with increased BP, ??small stroke. MRI and TERI recommended but pt is refusing. Please evaluate capacity regarding these procedures. 78-year-old male with known hx of benzo rx (per team, also with prior Sertraline rx), admitted on 10/19/18 with AMS s/p collapsing in optometrist's office. Found to have KHOA, hypokalemia, increased creatinine. Stroke code and rapid response called yesterday. Pt with increased BP, ??small stroke. MRI and TERI recommended but pt is refusing. Please evaluate capacity regarding these procedures.

## 2018-10-23 NOTE — BEHAVIORAL HEALTH ASSESSMENT NOTE - SUMMARY
78-year-old male with known hx of benzo rx (per team, also with prior Sertraline rx), admitted with AMS s/p collapsing in optometrist's office. Found to have KHOA, hypokalemia, increased creatinine. Stroke code and rapid response called yesterday. MRI and TERI recommended but pt initially refused.   Pt minimally engageable, inattentive, lethargic. He passively agrees to workup, yet is unable/unwilling to discuss risks and benefits of recommended procedures. 78-year-old male with known hx of benzo rx (per team, also with prior Sertraline rx), admitted with AMS s/p collapsing in optometrist's office. Found to have KHOA, hypokalemia, increased creatinine. Stroke code and rapid response called yesterday. MRI and TERI recommended but pt initially refused.   Pt minimally engageable, inattentive, lethargic. He passively agrees to workup, yet is unable/unwilling to discuss risks and benefits of recommended procedures.    Pt currently LACKS CAPACITY regarding medical decision making, including MRI and TTE, though, as stated, he passively agrees to workup.    Defer to team regarding necessity of proceeding with MRI and TTE. Would continue to attempt to engage pt to further assess his understanding of risks and benefits of procedures. If procedures determined to be medically necessary to avoid serious/life-threatening consequences, may require 2-P.C. to proceed.

## 2018-10-23 NOTE — BEHAVIORAL HEALTH ASSESSMENT NOTE - NSBHCONSULTRECOMMENDOTHER_PSY_A_CORE FT
Pt currently LACKS CAPACITY regarding medical decision making.    Dr. Mcdaniel to request permission from pt to contact his outpt psychiatrist, Dr. El Jimenez @ [phone].

## 2018-10-23 NOTE — PROGRESS NOTE ADULT - PROBLEM SELECTOR PLAN 2
Overnight 10/22 pt /112. All other VSS. On exam pt was noted to be lethargic and weak. Was responsive (made grunting noises) but not vocal about if he was in pain/would not provide answers to questions. s/p 5mg labetalol IVP. given change from prior mental status of AAOx3, rapid response was called.  Pt found to have rotary nystagmus. Continued to be weak, not following commands, and very agitated.  Stroke code was called.  Was given haldol 2mg IM; agitated improved and pt was minimally responsive to command .Labs notable for mild hyponatremia. Initial read of CT brain without evidence of hemorrhage of acute infarct. After CT scan pt was much improved: AAOx3, responsive, and following commands with no focal neurologic deficits. ICU consulted; rejected patient. Episode likely 2/2 hypertensive emergency, given improvement with 5mg IV labetalol and improvement of pressures to SBP 150s.  -Per neuro CT head shows mild subacute embolic CVA  -pt refused TERI and MRI head today. Pt sent back to floors. Psych consult for capacity.   -started on aspirin 81mg PO qd and plavix 75mg PO qd 10/22  -neuro following. will f/u recs  -no focal deficits observed on neuro exam

## 2018-10-23 NOTE — PROGRESS NOTE ADULT - PROBLEM SELECTOR PLAN 1
Around 2am 10/22 pt was found to have /112. All other VSS. On exam pt was noted to be lethargic and weak. s/p 5mg labetalol IVP. However given change from prior mental status of AAOx3, rapid response was called.  Pt found to have rotary nystagmus. Continued to be weak, not following commands, and very agitated.  Stroke code was called.  Was given haldol 2mg IM; agitated improved and pt was minimally responsive to commands. Unable to obtain EKG given agitation and restlessness. Labs drawn and pt was sent down for CT scan. Dr. Peters was present and the case was discussed with Dr. Mari. Labs notable for mild hyponatremia. CT brain without evidence of hemorrhage of acute infarct. After CT scan pt was much improved: AAOx3, responsive, and following commands with no focal neurologic deficits. ICU was consulted; rejected patient and provided recommendations for primary team. Episode likely 2/2 hypertensive emergency, given improvement with 5mg IV labetalol and improvement of pressures to SBP 150s.  -increased labetalol to 200mg PO bid  -c/w losartan 100mg PO qd  -dc hydralazine 25mg PO q8h 10/22  -c/w amlodipine 10mg PO qd  -strict monitoring of BP to 161/83  -BP now improved to 145/70  -continue to monitor BP. pt now in hypertensive urgency but not emergency.

## 2018-10-23 NOTE — BEHAVIORAL HEALTH ASSESSMENT NOTE - ORIENTATION OTHER
Pt appears to be oriented to self. Recalls that he collapsed in doctor's office. Unable to evaluate for orientation to time and place.

## 2018-10-23 NOTE — BEHAVIORAL HEALTH ASSESSMENT NOTE - NSBHCHARTREVIEWVS_PSY_A_CORE FT
ICU Vital Signs Last 24 Hrs  T(C): 37.1 (23 Oct 2018 21:54), Max: 37.3 (23 Oct 2018 05:21)  T(F): 98.8 (23 Oct 2018 21:54), Max: 99.2 (23 Oct 2018 05:21)  HR: 63 (23 Oct 2018 21:54) (63 - 74)  BP: 147/67 (23 Oct 2018 21:54) (145/70 - 186/79)  BP(mean): --  ABP: --  ABP(mean): --  RR: 18 (23 Oct 2018 21:54) (16 - 18)  SpO2: 97% (23 Oct 2018 21:54) (96% - 97%)

## 2018-10-23 NOTE — BEHAVIORAL HEALTH ASSESSMENT NOTE - NSBHCHARTREVIEWLAB_PSY_A_CORE FT
14.1   8.9   )-----------( 221      ( 23 Oct 2018 07:19 )             38.6       10-23    136  |  100  |  13  ----------------------------<  121<H>  3.4<L>   |  17<L>  |  1.29    Ca    9.0      23 Oct 2018 07:19  Phos  1.4     10-22  Mg     2.0     10-23    TPro  7.0  /  Alb  4.0  /  TBili  0.7  /  DBili  x   /  AST  16  /  ALT  8<L>  /  AlkPhos  71  10-22      Urinalysis (10.19.18 @ 18:21)    Glucose Qualitative, Urine: NEGATIVE    Blood, Urine: Trace    pH Urine: 5.5    Color: Yellow    Urine Appearance: Clear    Bilirubin: Large    Ketone - Urine: 40 mg/dL    Specific Gravity: 1.025    Protein, Urine: 30 mg/dL    Urobilinogen: 1.0 E.U./dL    Nitrite: NEGATIVE    Leukocyte Esterase Concentration: NEGATIVE

## 2018-10-23 NOTE — CHART NOTE - NSCHARTNOTEFT_GEN_A_CORE
PGY-1 Event Note    Patient refused TERI. Intern went to bedside at echo lab and told the patient the risks of refusing TERI. Decision was made to delay the TERI and bring the patient back to the floor. PGY-1 Event Note    Patient refused TERI and MRI head today. Intern went to bedside at echo lab and told the patient the risks of refusing TERI. Decision was made to delay the TERI and bring the patient back to the floor. Psych to evaluate for capacity.

## 2018-10-23 NOTE — BEHAVIORAL HEALTH ASSESSMENT NOTE - HPI (INCLUDE ILLNESS QUALITY, SEVERITY, DURATION, TIMING, CONTEXT, MODIFYING FACTORS, ASSOCIATED SIGNS AND SYMPTOMS)
HPI from admit:  "Pt is a 78M, poor historian, with a PMHx HTN, depression and anxiety who was brought by EMS to [facility] after an episode at his optometrist's office in which he slouched over and was somnolent. Optometrist called 911 and the [facility] mobile stroke unit also arrived. He was determined on imaging not to have had an acute CVA. He does report taking Xanax just before his appt. He was on Xanax chronically before, but tapered himself off of it. Denies hx sz. He says that he vaguely recalls the episode at his optometrist's office. He denies confusion, HA, focal weakness, numbness/tingling, dysphagia, hearing/vision change, slurred speech. Denies F/C, CP, SOB, cough, abd pain, N/V/D. He does suffer from mild constipation, last BM yesterday. Denies blood in stool. Believes he has lost weight but unsure how much. Denies night sweats. Last c scope 10 years ago reportedly normal. Never had an EGD. He reports progressive weakness and decreased PO intake due to lack of appetite x several weeks. He was seen three years ago with similar symptoms but left against medical advice before having an EGD. He denies substance use or etoh use. Otherwise ROS neg.    In the ED, T 97.6, HR 61, /63, RR 18, 97% on RA. Labs notable for WBC 14.5 (normal diff), K 2.2, bicarb 17, cr 2.8, PTT 37, INR 1.2, lactate 2.1, trop I 0.12, Utox (+) benzos. CTH showed small vessel dz. CXR clear. Received , K 10 meq x 3, K 40 meq PO x 2, 2L NS. Repeat BMP showed persistent hypokalemia. EKG showed SR with RBBB and prolonged QTc ~500." (20 Oct 2018 02:48)    As stated above, stroke code and rapid response called yesterday. Pt found to be hypertensive, ??small sub stroke. MRI and TTE recommended for workup, but pt refused.    Psychiatric consult requested to evaluate pt's decisional capacity regarding recommended MRI and TTE. In addition, pt with known hx of benzo prescriptions, acknowledged efforts to self-taper medications. Pt with flat, constricted affect.     On interview, pt observed lying listlessly in bed. Is minimally engageable. Does state he collapsed in his doctor's office. Reports he lives alone. Is unable/unwilling to engage in further discussion regarding his hx or how he is feeling now.     Denies being told re MRI and TTE. When told by this writer regarding why MRI and TTE are being recommended, passively nods that he will agree to procedures, yet is unable/unwilling to engage in further discussion regarding indication for procedures, risks and benefits.

## 2018-10-23 NOTE — PROGRESS NOTE ADULT - PROBLEM SELECTOR PLAN 4
Presenting with cr 2.8. unknown baseline. reports no hx kidney dz. Unclear etiology. suspect prerenal as improved with IVF  -continuing to improve  -strict I/O  -avoid nephrotoxic agents  -trend BMP  -dc ringer's lactate in setting of improved renal function

## 2018-10-23 NOTE — BEHAVIORAL HEALTH ASSESSMENT NOTE - OTHER
Not tested In bed "Alright" Impoverished UTO Recalls that he collapsed in doctor's office. Unclear regarding whether he remembers what happens, or recalls being told by others.

## 2018-10-23 NOTE — BEHAVIORAL HEALTH ASSESSMENT NOTE - DIFFERENTIAL
Toxic-Metabolix Encephalopathy  Hx of Anxiety (rx with alprazolam)  r/o hx of Depression (prior SSRI use, which may have been rx'ed for anxiety and/or depression)

## 2018-10-23 NOTE — PROGRESS NOTE ADULT - SUBJECTIVE AND OBJECTIVE BOX
OVERNIGHT EVENTS: SYED    SUBJECTIVE / INTERVAL HPI: Patient seen and examined at bedside.  Pt states he is feeling better. He no longer has diarrhea and he is urinating. He does not feel as out of it today. He denies f/c, headache, dizziness, CP, palpitations, SOB, abd pain, n/v, d/c, dysuria, hematuria    VITAL SIGNS:  Vital Signs Last 24 Hrs  T(C): 37.2 (23 Oct 2018 09:52), Max: 37.3 (23 Oct 2018 05:21)  T(F): 98.9 (23 Oct 2018 09:52), Max: 99.2 (23 Oct 2018 05:21)  HR: 67 (23 Oct 2018 09:52) (58 - 69)  BP: 145/70 (23 Oct 2018 09:52) (143/74 - 186/79)  BP(mean): --  RR: 16 (23 Oct 2018 09:52) (16 - 18)  SpO2: 97% (23 Oct 2018 09:52) (96% - 98%)    PHYSICAL EXAM:    General: NAD, resting comfortably in bed  HEENT: NCAT; PERRL, anicteric sclera  Neck: supple, trachea midline  Cardiovascular: S1, S2 normal; RRR, no M/G/R  Respiratory: CTABL; no W/R/R  Gastrointestinal: soft, nontender, nondistended. bowel sounds present.  Skin: no ulcerations or visible rashes appreciated  Extremities: WWP; no edema, clubbing or cyanosis  Vascular: 2+ radial, DP/PT pulses B/L  Psychiatric: flat affect, depressed  Neurological: AAOx2 (person, place); no focal deficits    MEDICATIONS:  MEDICATIONS  (STANDING):  amLODIPine   Tablet 10 milliGRAM(s) Oral daily  aspirin  chewable 81 milliGRAM(s) Oral daily  atorvastatin 80 milliGRAM(s) Oral at bedtime  clopidogrel Tablet 75 milliGRAM(s) Oral daily  heparin  Injectable 5000 Unit(s) SubCutaneous every 8 hours  influenza   Vaccine 0.5 milliLiter(s) IntraMuscular once  labetalol 200 milliGRAM(s) Oral two times a day  latanoprost 0.005% Ophthalmic Solution 1 Drop(s) Both EYES at bedtime  losartan 100 milliGRAM(s) Oral daily  melatonin 5 milliGRAM(s) Oral at bedtime  timolol 0.25% Solution 1 Drop(s) Both EYES two times a day    MEDICATIONS  (PRN):  simethicone 80 milliGRAM(s) Chew four times a day PRN Gas      ALLERGIES:  Allergies    No Known Allergies    Intolerances        LABS:                        14.1   8.9   )-----------( 221      ( 23 Oct 2018 07:19 )             38.6     10-23    136  |  100  |  13  ----------------------------<  121<H>  3.4<L>   |  17<L>  |  1.29    Ca    9.0      23 Oct 2018 07:19  Phos  1.4     10-22  Mg     2.0     10-23    TPro  7.0  /  Alb  4.0  /  TBili  0.7  /  DBili  x   /  AST  16  /  ALT  8<L>  /  AlkPhos  71  10-22    PT/INR - ( 22 Oct 2018 02:30 )   PT: 13.2 sec;   INR: 1.19          PTT - ( 22 Oct 2018 02:30 )  PTT:32.3 sec    CAPILLARY BLOOD GLUCOSE      POCT Blood Glucose.: 138 mg/dL (22 Oct 2018 22:11)      RADIOLOGY & ADDITIONAL TESTS: Reviewed.

## 2018-10-23 NOTE — BEHAVIORAL HEALTH ASSESSMENT NOTE - OTHER PAST PSYCHIATRIC HISTORY (INCLUDE DETAILS REGARDING ONSET, COURSE OF ILLNESS, INPATIENT/OUTPATIENT TREATMENT)
Per ISTOP investigation, information obtained regarding prior outpt rx:    El Jimenez MD, outpt psychiatrist at [address] [place] [address] ([phone]) prescribed:    alprazolam 1 mg #30 on 5/21/18  alprazolam 0.5 mg #90 on 6/21/18

## 2018-10-24 DIAGNOSIS — F33.1 MAJOR DEPRESSIVE DISORDER, RECURRENT, MODERATE: ICD-10-CM

## 2018-10-24 DIAGNOSIS — F13.10 SEDATIVE, HYPNOTIC OR ANXIOLYTIC ABUSE, UNCOMPLICATED: ICD-10-CM

## 2018-10-24 DIAGNOSIS — Z87.898 PERSONAL HISTORY OF OTHER SPECIFIED CONDITIONS: ICD-10-CM

## 2018-10-24 LAB
ANION GAP SERPL CALC-SCNC: 15 MMOL/L — SIGNIFICANT CHANGE UP (ref 5–17)
BUN SERPL-MCNC: 15 MG/DL — SIGNIFICANT CHANGE UP (ref 7–23)
CALCIUM SERPL-MCNC: 9 MG/DL — SIGNIFICANT CHANGE UP (ref 8.4–10.5)
CHLORIDE SERPL-SCNC: 101 MMOL/L — SIGNIFICANT CHANGE UP (ref 96–108)
CO2 SERPL-SCNC: 20 MMOL/L — LOW (ref 22–31)
CREAT SERPL-MCNC: 1.15 MG/DL — SIGNIFICANT CHANGE UP (ref 0.5–1.3)
GLUCOSE SERPL-MCNC: 158 MG/DL — HIGH (ref 70–99)
HCT VFR BLD CALC: 37.8 % — LOW (ref 39–50)
HGB BLD-MCNC: 13.8 G/DL — SIGNIFICANT CHANGE UP (ref 13–17)
MAGNESIUM SERPL-MCNC: 2 MG/DL — SIGNIFICANT CHANGE UP (ref 1.6–2.6)
MCHC RBC-ENTMCNC: 31.3 PG — SIGNIFICANT CHANGE UP (ref 27–34)
MCHC RBC-ENTMCNC: 36.5 G/DL — HIGH (ref 32–36)
MCV RBC AUTO: 85.7 FL — SIGNIFICANT CHANGE UP (ref 80–100)
PLATELET # BLD AUTO: 209 K/UL — SIGNIFICANT CHANGE UP (ref 150–400)
POTASSIUM SERPL-MCNC: 3.5 MMOL/L — SIGNIFICANT CHANGE UP (ref 3.5–5.3)
POTASSIUM SERPL-SCNC: 3.5 MMOL/L — SIGNIFICANT CHANGE UP (ref 3.5–5.3)
RBC # BLD: 4.41 M/UL — SIGNIFICANT CHANGE UP (ref 4.2–5.8)
RBC # FLD: 12.3 % — SIGNIFICANT CHANGE UP (ref 10.3–16.9)
SODIUM SERPL-SCNC: 136 MMOL/L — SIGNIFICANT CHANGE UP (ref 135–145)
WBC # BLD: 8.6 K/UL — SIGNIFICANT CHANGE UP (ref 3.8–10.5)
WBC # FLD AUTO: 8.6 K/UL — SIGNIFICANT CHANGE UP (ref 3.8–10.5)

## 2018-10-24 PROCEDURE — 99233 SBSQ HOSP IP/OBS HIGH 50: CPT

## 2018-10-24 PROCEDURE — 99232 SBSQ HOSP IP/OBS MODERATE 35: CPT | Mod: GC

## 2018-10-24 RX ORDER — POTASSIUM CHLORIDE 20 MEQ
20 PACKET (EA) ORAL ONCE
Qty: 0 | Refills: 0 | Status: COMPLETED | OUTPATIENT
Start: 2018-10-24 | End: 2018-10-24

## 2018-10-24 RX ORDER — POTASSIUM CHLORIDE 20 MEQ
10 PACKET (EA) ORAL
Qty: 0 | Refills: 0 | Status: COMPLETED | OUTPATIENT
Start: 2018-10-24 | End: 2018-10-24

## 2018-10-24 RX ORDER — ESCITALOPRAM OXALATE 10 MG/1
5 TABLET, FILM COATED ORAL DAILY
Qty: 0 | Refills: 0 | Status: DISCONTINUED | OUTPATIENT
Start: 2018-10-24 | End: 2018-10-26

## 2018-10-24 RX ADMIN — HEPARIN SODIUM 5000 UNIT(S): 5000 INJECTION INTRAVENOUS; SUBCUTANEOUS at 22:00

## 2018-10-24 RX ADMIN — HEPARIN SODIUM 5000 UNIT(S): 5000 INJECTION INTRAVENOUS; SUBCUTANEOUS at 05:20

## 2018-10-24 RX ADMIN — CLOPIDOGREL BISULFATE 75 MILLIGRAM(S): 75 TABLET, FILM COATED ORAL at 12:55

## 2018-10-24 RX ADMIN — Medication 5 MILLIGRAM(S): at 21:59

## 2018-10-24 RX ADMIN — LATANOPROST 1 DROP(S): 0.05 SOLUTION/ DROPS OPHTHALMIC; TOPICAL at 21:59

## 2018-10-24 RX ADMIN — Medication 200 MILLIGRAM(S): at 05:20

## 2018-10-24 RX ADMIN — Medication 100 MILLIEQUIVALENT(S): at 11:32

## 2018-10-24 RX ADMIN — ESCITALOPRAM OXALATE 5 MILLIGRAM(S): 10 TABLET, FILM COATED ORAL at 12:55

## 2018-10-24 RX ADMIN — Medication 81 MILLIGRAM(S): at 12:55

## 2018-10-24 RX ADMIN — Medication 200 MILLIGRAM(S): at 19:03

## 2018-10-24 RX ADMIN — ATORVASTATIN CALCIUM 80 MILLIGRAM(S): 80 TABLET, FILM COATED ORAL at 21:59

## 2018-10-24 RX ADMIN — Medication 20 MILLIEQUIVALENT(S): at 08:08

## 2018-10-24 RX ADMIN — Medication 100 MILLIEQUIVALENT(S): at 08:08

## 2018-10-24 RX ADMIN — HEPARIN SODIUM 5000 UNIT(S): 5000 INJECTION INTRAVENOUS; SUBCUTANEOUS at 13:07

## 2018-10-24 RX ADMIN — Medication 100 MILLIEQUIVALENT(S): at 09:22

## 2018-10-24 RX ADMIN — AMLODIPINE BESYLATE 10 MILLIGRAM(S): 2.5 TABLET ORAL at 05:20

## 2018-10-24 RX ADMIN — Medication 1 DROP(S): at 19:03

## 2018-10-24 RX ADMIN — Medication 1 DROP(S): at 05:20

## 2018-10-24 RX ADMIN — LOSARTAN POTASSIUM 100 MILLIGRAM(S): 100 TABLET, FILM COATED ORAL at 05:20

## 2018-10-24 NOTE — DIETITIAN INITIAL EVALUATION ADULT. - ENERGY NEEDS
ABW used for calculations as pt between % of IBW.   ABW 72.4kg, IBW 70kg, 102% IBW, ht 68", BMI 24.3   Nutrient needs based on [facility] standards of care for maintenance in adults, increased needs 2/2 dec PO, wt loss

## 2018-10-24 NOTE — PROGRESS NOTE ADULT - PROBLEM SELECTOR PLAN 9
F: No IVF  E: replete PRN  N: DASH    DVT ppx: SCDs  RMF  full code  no protonix 1) PCP Contacted on Admission: NA- no PCP- will need LHM   2) Date of Contact with PCP: NA  3) PCP Contacted at Discharge: NA  4) Summary of Handoff Given to PCP:   5) Post-Discharge Appointment Date and Location:

## 2018-10-24 NOTE — PROGRESS NOTE ADULT - PROBLEM SELECTOR PLAN 7
Patient with delayed responses. oriented x 3 but has difficulty remembering things such as medications, PMHx. Likely 2/2 Xanax vs hyponatremia, dehydration.  -No e/o infection aside from leukocytosis (normal diff, downtrending).   -B12, TSH, RPR  -avoid sedating medications Patient with delayed responses. oriented x 3 but has difficulty remembering things such as medications, PMHx. Likely 2/2 combination of xanax use, dehydration, and depression.  -No e/o infection aside from leukocytosis (normal diff, downtrending).   -B12, TSH normal  -f/u RPR  -avoid sedating medications

## 2018-10-24 NOTE — PROGRESS NOTE ADULT - PROBLEM SELECTOR PLAN 6
K 2.2 on admission. persistent hypokalemia. Likely 2/2 diarrhea.  pt initially stated he hasn't had BM in 2-3 days but on further questioning stated he has also had diarrhea. Received 110 meq prior to arrival  -K improved 3.7 10/22  -no EKG changes.  -f/u urine anion gap  -trend BMP and Mg K 2.2 on admission. persistent hypokalemia. Likely 2/2 diarrhea.  pt initially stated he hasn't had BM in 2-3 days but on further questioning stated he has also had diarrhea. Received 110 meq prior to arrival  -K fluctuates but improved from admission.   -no EKG changes.  -trend BMP and Mg

## 2018-10-24 NOTE — PROGRESS NOTE BEHAVIORAL HEALTH - NSBHCONSULTRECOMMENDOTHER_PSY_A_CORE FT
Pt declines voluntary inpt psychiatric admission and is not appropriate for involuntary admmission.    Pt would benefit from outpt follow up.    Will explore possible option for follow up at [facility] (outpt psychiatry clinic at [facility]). Pt declines voluntary inpt psychiatric admission and is not appropriate for involuntary admmission.    Pt would benefit from outpt follow up.    Please see below for tentative outpt follow up next week.

## 2018-10-24 NOTE — PROGRESS NOTE BEHAVIORAL HEALTH - NSBHFUPINTERVALHXFT_PSY_A_CORE
Pt seen; chart reviewed; discussed with team.  Pt observed to be more alert, A+O x 4, able to participate in conversation, though still minimally engageable.    Today, he acknowledges prior hx of both inpt and outpt psychiatric care. He reports he was on 8 [facility] at [facility] several years ago, states it was for 6 months**. In addition to seeing Dr. El Jimenez earlier this year, pt reports he saw Dr. Kalyan Cuenca x 6 months last year. Pt reports that neither treatment was helpful. He adds that prior rx with antidepressants did not benefit him.    Pt denies suicidal ideation/intent/plan. Is not interested in psychiatric admission. Is willing to follow up at [facility] as outpt.    **Examination of prior [facility] admits confirms that pt was admitted to [facility] 8 [facility]ris from 12/28/14-1/15/15. His discharge diagnoses included Anxiety D/O NOS, Depressive D/O NOS, EtOH Abuse and Benzodiazepine Abuse. Pt was discharged on Lexapro 15 mg po qdaily, klonopin 1 mg po BID, trazodone 150 mg po qhs.    Team reports that prior to admission, pt was not eating properly, and his presenting ss/sx and lab abnormalities were likely influenced by poor po intake. Earlier today, pt endorsed depressed mood>>rx with Lexapro 5 mg po qdaily.

## 2018-10-24 NOTE — PROGRESS NOTE BEHAVIORAL HEALTH - OTHER
Not tested In bed "Alright" Impoverished UTO Recalls that he collapsed in doctor's office. Unclear regarding whether he remembers what happens, or recalls being told by others. 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 He recalls being admitted to [facility] 8 [facility], though states he was admitted x 6 months.

## 2018-10-24 NOTE — PROGRESS NOTE ADULT - PROBLEM SELECTOR PLAN 5
Presenting with cr 2.8. unknown baseline. reports no hx kidney dz. Unclear etiology. suspect prerenal as improved with IVF  -continuing to improve  -strict I/O  -avoid nephrotoxic agents  -trend BMP  -dc ringer's lactate in setting of improved renal function Presenting with cr 2.8. unknown baseline. reports no hx kidney dz. 2/2 starvation and dehydration.  -Resolved.   -avoid nephrotoxic agents  -trend BMP

## 2018-10-24 NOTE — PROGRESS NOTE ADULT - SUBJECTIVE AND OBJECTIVE BOX
Neurology Stroke Progress Note    INTERVAL HPI/OVERNIGHT EVENTS:  Patient seen and examined. Sitting up and more interactive today. Would like to speak to someone regarding his mood. Agreeable to starting lexapro    MEDICATIONS  (STANDING):  amLODIPine   Tablet 10 milliGRAM(s) Oral daily  aspirin  chewable 81 milliGRAM(s) Oral daily  atorvastatin 80 milliGRAM(s) Oral at bedtime  clopidogrel Tablet 75 milliGRAM(s) Oral daily  dextrose 5% + sodium chloride 0.9%. 1000 milliLiter(s) (70 mL/Hr) IV Continuous <Continuous>  escitalopram 5 milliGRAM(s) Oral daily  heparin  Injectable 5000 Unit(s) SubCutaneous every 8 hours  influenza   Vaccine 0.5 milliLiter(s) IntraMuscular once  labetalol 200 milliGRAM(s) Oral two times a day  latanoprost 0.005% Ophthalmic Solution 1 Drop(s) Both EYES at bedtime  losartan 100 milliGRAM(s) Oral daily  melatonin 5 milliGRAM(s) Oral at bedtime  timolol 0.25% Solution 1 Drop(s) Both EYES two times a day    MEDICATIONS  (PRN):  simethicone 80 milliGRAM(s) Chew four times a day PRN Gas      Allergies    No Known Allergies        ROS: As per HPI, otherwise negative    Vital Signs Last 24 Hrs  T(C): 36.8 (24 Oct 2018 09:12), Max: 37.3 (23 Oct 2018 16:49)  T(F): 98.3 (24 Oct 2018 09:12), Max: 99.2 (23 Oct 2018 16:49)  HR: 61 (24 Oct 2018 09:12) (60 - 74)  BP: 148/79 (24 Oct 2018 09:12) (147/67 - 186/77)  BP(mean): --  RR: 18 (24 Oct 2018 09:12) (18 - 19)  SpO2: 98% (24 Oct 2018 09:12) (97% - 98%)    Physical exam:  General: Sitting up, depressed affect but talking and following commands today.  Neurologic:  Mental status: awake, alert, oriented to person. More awake today, answering questions, agreeable to testing today.  Cranial nerves:   II: doesn't keep eyes open to test visual fields  III, IV, VI: EOMI without nystagmus  V:  V1-V3 sensation intact,   VII: no facial droop  VIII: hearing is intact to finger rub  IX, X: Uvula is midline and soft palate rises symmetrically  XII: tongue midline  Motor: Able to lift both arms but left pronator drift. Lifts both legs  Sensation: not cooperating  Coordination: not cooperating  Reflexes: 2+ in upper and lower extremities, downgoing toes bilaterally  Gait: deferred        LABS:                        13.8   8.6   )-----------( 209      ( 24 Oct 2018 06:50 )             37.8     10-24    136  |  101  |  15  ----------------------------<  158<H>  3.5   |  20<L>  |  1.15    Ca    9.0      24 Oct 2018 06:50  Mg     2.0     10-24            RADIOLOGY & ADDITIONAL TESTS:      Assessment and Plan  78y yo Male poor historian, with a PMHx HTN, depression and anxiety with toxic metabolic encephalopathy, acute renal failure, hypokalemia and hyponatremia, now with subacute stroke seen on CT    1)Secondary stroke prevention  - Aspirin and plavix  -Atorvastatin 80    2) Further workup   -MRI  -TERI  -Please start Lexapro 5    DVT prophylaxis   -Heparin SQ TID and SCDs

## 2018-10-24 NOTE — PROGRESS NOTE ADULT - PROBLEM SELECTOR PLAN 2
Around 2am 10/22 pt was found to have /112. All other VSS. On exam pt was noted to be lethargic and weak. s/p 5mg labetalol IVP. However given change from prior mental status of AAOx3, rapid response was called.  Pt found to have rotary nystagmus. Continued to be weak, not following commands, and very agitated.  Stroke code was called.  Was given haldol 2mg IM; agitated improved and pt was minimally responsive to commands. Unable to obtain EKG given agitation and restlessness. Labs drawn and pt was sent down for CT scan. Dr. Peters was present and the case was discussed with Dr. Mari. Labs notable for mild hyponatremia. CT brain without evidence of hemorrhage of acute infarct. After CT scan pt was much improved: AAOx3, responsive, and following commands with no focal neurologic deficits. ICU was consulted; rejected patient and provided recommendations for primary team. Episode likely 2/2 hypertensive emergency, given improvement with 5mg IV labetalol and improvement of pressures to SBP 150s.  -Hypertensive emergency resolved.     #HTN  -c/w labetalol 200mg PO bid  -c/w losartan 100mg PO qd  -start hydralazine 25mg PO q8h if sBP>160  -c/w amlodipine 10mg PO qd  -strict monitoring of BP to 161/83  -BP now improved to 145/70  -continue to monitor BP. pt now in hypertensive urgency but not emergency. Around 2am 10/22 pt was found to have /112. All other VSS. On exam pt was noted to be lethargic and weak. s/p 5mg labetalol IVP. However given change from prior mental status of AAOx3, rapid response was called.  Pt found to have rotary nystagmus. Continued to be weak, not following commands, and very agitated.  Stroke code was called.  Was given haldol 2mg IM; agitated improved and pt was minimally responsive to commands. Unable to obtain EKG given agitation and restlessness. Labs drawn and pt was sent down for CT scan. Dr. Peters was present and the case was discussed with Dr. Mari. Labs notable for mild hyponatremia. CT brain without evidence of hemorrhage of acute infarct. After CT scan pt was much improved: AAOx3, responsive, and following commands with no focal neurologic deficits. ICU was consulted; rejected patient and provided recommendations for primary team. Episode likely 2/2 hypertensive emergency, given improvement with 5mg IV labetalol and improvement of pressures to SBP 150s.  -Hypertensive emergency resolved.     #HTN  -c/w labetalol 200mg PO bid  -c/w losartan 100mg PO qd  -start hydralazine 25mg PO q8h if sBP>160  -c/w amlodipine 10mg PO qd  -BP now improved to 148/79

## 2018-10-24 NOTE — PROGRESS NOTE ADULT - PROBLEM SELECTOR PLAN 10
1) PCP Contacted on Admission: NA- no PCP- will need LHM   2) Date of Contact with PCP: NA  3) PCP Contacted at Discharge: NA  4) Summary of Handoff Given to PCP:   5) Post-Discharge Appointment Date and Location:
1) PCP Contacted on Admission: (N) --> Name & Phone #:   2) Date of Contact with PCP:  3) PCP Contacted at Discharge: (Y/N, N/A)  4) Summary of Handoff Given to PCP:   5) Post-Discharge Appointment Date and Location:

## 2018-10-24 NOTE — PROGRESS NOTE ADULT - PROBLEM SELECTOR PLAN 1
History of depression. Previously on sertraline. Denies SI/HI  -Pt has very flat and constricted affect and states he is depressed.  -Psych consult. Hopefully transfer to 8 uris. History of depression. Previously on sertraline. Denies SI/HI  -Pt has very flat and constricted affect and states he is depressed.  -Psych consult. Hopefully transfer to 8 Uris. History of depression. Previously on sertraline. Denies SI/HI  -Pt has very flat and constricted affect and states he is depressed.  -Psych consult. Hopefully transfer to [facility].  -started on lexapro 5mg PO qd

## 2018-10-24 NOTE — PROGRESS NOTE BEHAVIORAL HEALTH - NSBHCONSULTFOLLOWAFTERCARE_PSY_A_CORE FT
Pt would benefit from outpt follow up.    Will explore possible option for follow up at [facility] (outpt psychiatry clinic at [facility]). Pt would benefit from outpt follow up.    Tentative outpt follow up:    Tuesday, October 30th, 10 AM    [facility] ([facility]) at [facility]: [address], 4th floor [phone].    Pt will be contacted to confirm appointment.

## 2018-10-24 NOTE — PROGRESS NOTE ADULT - PROBLEM SELECTOR PLAN 4
Anion gap on admission of 17. Likely 2/2 starvation ketoacidosis as UA + for ketones and pt depressed and not eating.   -AG reopened 10/23 again likely 2/2 starvation ketoacidosis as pt is not eating much  -serum B hydroxy butyrate is 1.9 supports diagnosis of starvation ketoacidosis  -lactate 1.3 wnl  -AG closed today.   -Psych consulted. will f/u recs. Anion gap on admission of 17 2/2 starvation ketoacidosis as UA + for ketones and pt depressed and not eating.   -AG reopened 10/23 again likely 2/2 starvation ketoacidosis as pt is not eating much  -serum B hydroxy butyrate is 1.9 supports diagnosis of starvation ketoacidosis  -lactate 1.3 wnl  -AG closed today.   -c/w D5NS at 70cc/h as pt is refusing food.   -Psych consulted for anorexia. will f/u recs.

## 2018-10-24 NOTE — PROGRESS NOTE ADULT - PROBLEM SELECTOR PLAN 8
-Na 130, stable. Unclear etiology. not improved with 2L NS  -f/u urine studies, TSH, serum osm  -trend BMP  -c/w D5NS at 80 for now F: D5NS at 70cc/h   E: replete PRN  N: DASH    DVT ppx: SCDs  RMF  full code  no protonix

## 2018-10-24 NOTE — PROGRESS NOTE ADULT - SUBJECTIVE AND OBJECTIVE BOX
OVERNIGHT EVENTS: SYED    SUBJECTIVE / INTERVAL HPI: Patient seen and examined at bedside. Pt states he feels very depressed. He denies any suicidal ideation. Denies f/c, headache, dizziness, CP, SOB, abd pain, n/v, d/c, dysuria, hematuria.     VITAL SIGNS:  Vital Signs Last 24 Hrs  T(C): 36.8 (24 Oct 2018 09:12), Max: 37.3 (23 Oct 2018 16:49)  T(F): 98.3 (24 Oct 2018 09:12), Max: 99.2 (23 Oct 2018 16:49)  HR: 61 (24 Oct 2018 09:12) (60 - 74)  BP: 148/79 (24 Oct 2018 09:12) (147/67 - 186/77)  BP(mean): --  RR: 18 (24 Oct 2018 09:12) (18 - 19)  SpO2: 98% (24 Oct 2018 09:12) (97% - 98%)    PHYSICAL EXAM:    General: NAD, depressing looking  HEENT: NCAT; PERRL, anicteric sclera  Neck: supple, trachea midline  Cardiovascular: S1, S2 normal; RRR, no M/G/R  Respiratory: CTABL; no W/R/R  Gastrointestinal: soft, nontender, nondistended. bowel sounds present.  Skin: no ulcerations or visible rashes appreciated  Extremities: WWP; no edema, clubbing or cyanosis  Vascular: 2+ radial, DP/PT pulses B/L  Neurological: AAOx3; no focal deficits  Psych: constricted, flat affect    MEDICATIONS:  MEDICATIONS  (STANDING):  amLODIPine   Tablet 10 milliGRAM(s) Oral daily  aspirin  chewable 81 milliGRAM(s) Oral daily  atorvastatin 80 milliGRAM(s) Oral at bedtime  clopidogrel Tablet 75 milliGRAM(s) Oral daily  dextrose 5% + sodium chloride 0.9%. 1000 milliLiter(s) (70 mL/Hr) IV Continuous <Continuous>  escitalopram 5 milliGRAM(s) Oral daily  heparin  Injectable 5000 Unit(s) SubCutaneous every 8 hours  influenza   Vaccine 0.5 milliLiter(s) IntraMuscular once  labetalol 200 milliGRAM(s) Oral two times a day  latanoprost 0.005% Ophthalmic Solution 1 Drop(s) Both EYES at bedtime  losartan 100 milliGRAM(s) Oral daily  melatonin 5 milliGRAM(s) Oral at bedtime  timolol 0.25% Solution 1 Drop(s) Both EYES two times a day    MEDICATIONS  (PRN):  simethicone 80 milliGRAM(s) Chew four times a day PRN Gas      ALLERGIES:  Allergies    No Known Allergies    Intolerances        LABS:                        13.8   8.6   )-----------( 209      ( 24 Oct 2018 06:50 )             37.8     10-24    136  |  101  |  15  ----------------------------<  158<H>  3.5   |  20<L>  |  1.15    Ca    9.0      24 Oct 2018 06:50  Mg     2.0     10-24          CAPILLARY BLOOD GLUCOSE      POCT Blood Glucose.: 138 mg/dL (22 Oct 2018 22:11)      RADIOLOGY & ADDITIONAL TESTS: Reviewed.

## 2018-10-24 NOTE — DIETITIAN INITIAL EVALUATION ADULT. - OTHER INFO
78M admitted from optometrist w episode of somnolence + CVA. Pt is poor historian, hx of HTN and depression. PTA pt reporting constipation on admit (has since resolved), dec PO intake x several weeks w wt loss, unsure of amount lost or UBW. Moved from [facility] to [facility], presenting with acute renal failure, hypokalemia/ hyponatremia. Pt refusing DAMIEN and PT at this time, refusing interview, does not wish to speak, psych consult noted. Leigh toxic metabolic encephalopathy and AMS  2/2 chronic xanax use. No noted n/v/d/c, chewing/ swallowing/pain impacting intake, skin is intact. Continues to be w/u with psych and for ARF, and now hypertensive emergency/ CVA. Will follow per protocol.

## 2018-10-24 NOTE — PROGRESS NOTE BEHAVIORAL HEALTH - NSBHFUPINTERVALCCFT_PSY_A_CORE
"I am feeling better, but I had diarrhea. Can you get me some imodium?" "I am feeling better, but I had diarrhea. Can you call my friend to bring me some imodium?"

## 2018-10-25 ENCOUNTER — TRANSCRIPTION ENCOUNTER (OUTPATIENT)
Age: 78
End: 2018-10-25

## 2018-10-25 LAB
ANION GAP SERPL CALC-SCNC: 17 MMOL/L — SIGNIFICANT CHANGE UP (ref 5–17)
BUN SERPL-MCNC: 17 MG/DL — SIGNIFICANT CHANGE UP (ref 7–23)
CALCIUM SERPL-MCNC: 9.2 MG/DL — SIGNIFICANT CHANGE UP (ref 8.4–10.5)
CHLORIDE SERPL-SCNC: 106 MMOL/L — SIGNIFICANT CHANGE UP (ref 96–108)
CO2 SERPL-SCNC: 17 MMOL/L — LOW (ref 22–31)
CREAT SERPL-MCNC: 1.13 MG/DL — SIGNIFICANT CHANGE UP (ref 0.5–1.3)
CULTURE RESULTS: SIGNIFICANT CHANGE UP
GLUCOSE SERPL-MCNC: 145 MG/DL — HIGH (ref 70–99)
HCT VFR BLD CALC: 39 % — SIGNIFICANT CHANGE UP (ref 39–50)
HGB BLD-MCNC: 13.8 G/DL — SIGNIFICANT CHANGE UP (ref 13–17)
MAGNESIUM SERPL-MCNC: 1.8 MG/DL — SIGNIFICANT CHANGE UP (ref 1.6–2.6)
MCHC RBC-ENTMCNC: 31.2 PG — SIGNIFICANT CHANGE UP (ref 27–34)
MCHC RBC-ENTMCNC: 35.4 G/DL — SIGNIFICANT CHANGE UP (ref 32–36)
MCV RBC AUTO: 88.2 FL — SIGNIFICANT CHANGE UP (ref 80–100)
PLATELET # BLD AUTO: 237 K/UL — SIGNIFICANT CHANGE UP (ref 150–400)
POTASSIUM SERPL-MCNC: 3.8 MMOL/L — SIGNIFICANT CHANGE UP (ref 3.5–5.3)
POTASSIUM SERPL-SCNC: 3.8 MMOL/L — SIGNIFICANT CHANGE UP (ref 3.5–5.3)
RBC # BLD: 4.42 M/UL — SIGNIFICANT CHANGE UP (ref 4.2–5.8)
RBC # FLD: 13 % — SIGNIFICANT CHANGE UP (ref 10.3–16.9)
SODIUM SERPL-SCNC: 140 MMOL/L — SIGNIFICANT CHANGE UP (ref 135–145)
SPECIMEN SOURCE: SIGNIFICANT CHANGE UP
T PALLIDUM AB TITR SER: NEGATIVE — SIGNIFICANT CHANGE UP
WBC # BLD: 9.3 K/UL — SIGNIFICANT CHANGE UP (ref 3.8–10.5)
WBC # FLD AUTO: 9.3 K/UL — SIGNIFICANT CHANGE UP (ref 3.8–10.5)

## 2018-10-25 PROCEDURE — 99232 SBSQ HOSP IP/OBS MODERATE 35: CPT | Mod: GC

## 2018-10-25 PROCEDURE — 99233 SBSQ HOSP IP/OBS HIGH 50: CPT

## 2018-10-25 RX ORDER — POTASSIUM CHLORIDE 20 MEQ
20 PACKET (EA) ORAL ONCE
Qty: 0 | Refills: 0 | Status: COMPLETED | OUTPATIENT
Start: 2018-10-25 | End: 2018-10-25

## 2018-10-25 RX ORDER — CLOPIDOGREL BISULFATE 75 MG/1
1 TABLET, FILM COATED ORAL
Qty: 0 | Refills: 0 | COMMUNITY
Start: 2018-10-25

## 2018-10-25 RX ORDER — ATORVASTATIN CALCIUM 80 MG/1
1 TABLET, FILM COATED ORAL
Qty: 30 | Refills: 0 | OUTPATIENT
Start: 2018-10-25 | End: 2018-11-23

## 2018-10-25 RX ORDER — AMLODIPINE BESYLATE 2.5 MG/1
1 TABLET ORAL
Qty: 0 | Refills: 0 | COMMUNITY
Start: 2018-10-25

## 2018-10-25 RX ORDER — ESCITALOPRAM OXALATE 10 MG/1
1 TABLET, FILM COATED ORAL
Qty: 30 | Refills: 0 | OUTPATIENT
Start: 2018-10-25 | End: 2018-11-23

## 2018-10-25 RX ORDER — CLOPIDOGREL BISULFATE 75 MG/1
1 TABLET, FILM COATED ORAL
Qty: 30 | Refills: 0 | OUTPATIENT
Start: 2018-10-25 | End: 2018-11-23

## 2018-10-25 RX ORDER — ESCITALOPRAM OXALATE 10 MG/1
1 TABLET, FILM COATED ORAL
Qty: 0 | Refills: 0 | COMMUNITY
Start: 2018-10-25

## 2018-10-25 RX ORDER — ATORVASTATIN CALCIUM 80 MG/1
1 TABLET, FILM COATED ORAL
Qty: 0 | Refills: 0 | COMMUNITY
Start: 2018-10-25

## 2018-10-25 RX ORDER — LABETALOL HCL 100 MG
1 TABLET ORAL
Qty: 60 | Refills: 0 | OUTPATIENT
Start: 2018-10-25 | End: 2018-11-23

## 2018-10-25 RX ORDER — MAGNESIUM SULFATE 500 MG/ML
1 VIAL (ML) INJECTION ONCE
Qty: 0 | Refills: 0 | Status: COMPLETED | OUTPATIENT
Start: 2018-10-25 | End: 2018-10-25

## 2018-10-25 RX ORDER — AMLODIPINE BESYLATE 2.5 MG/1
1 TABLET ORAL
Qty: 30 | Refills: 0 | OUTPATIENT
Start: 2018-10-25 | End: 2018-11-23

## 2018-10-25 RX ORDER — LABETALOL HCL 100 MG
1 TABLET ORAL
Qty: 0 | Refills: 0 | COMMUNITY
Start: 2018-10-25

## 2018-10-25 RX ORDER — LOSARTAN POTASSIUM 100 MG/1
1 TABLET, FILM COATED ORAL
Qty: 30 | Refills: 0 | OUTPATIENT
Start: 2018-10-25 | End: 2018-11-23

## 2018-10-25 RX ORDER — LOPERAMIDE HCL 2 MG
2 TABLET ORAL ONCE
Qty: 0 | Refills: 0 | Status: COMPLETED | OUTPATIENT
Start: 2018-10-25 | End: 2018-10-25

## 2018-10-25 RX ORDER — LOSARTAN POTASSIUM 100 MG/1
1 TABLET, FILM COATED ORAL
Qty: 0 | Refills: 0 | COMMUNITY
Start: 2018-10-25

## 2018-10-25 RX ORDER — ASPIRIN/CALCIUM CARB/MAGNESIUM 324 MG
1 TABLET ORAL
Qty: 0 | Refills: 0 | COMMUNITY
Start: 2018-10-25

## 2018-10-25 RX ORDER — ASPIRIN/CALCIUM CARB/MAGNESIUM 324 MG
1 TABLET ORAL
Qty: 30 | Refills: 0 | OUTPATIENT
Start: 2018-10-25 | End: 2018-11-23

## 2018-10-25 RX ADMIN — CLOPIDOGREL BISULFATE 75 MILLIGRAM(S): 75 TABLET, FILM COATED ORAL at 12:07

## 2018-10-25 RX ADMIN — Medication 100 GRAM(S): at 09:07

## 2018-10-25 RX ADMIN — Medication 81 MILLIGRAM(S): at 12:07

## 2018-10-25 RX ADMIN — ATORVASTATIN CALCIUM 80 MILLIGRAM(S): 80 TABLET, FILM COATED ORAL at 21:35

## 2018-10-25 RX ADMIN — LOSARTAN POTASSIUM 100 MILLIGRAM(S): 100 TABLET, FILM COATED ORAL at 07:13

## 2018-10-25 RX ADMIN — Medication 1 DROP(S): at 07:13

## 2018-10-25 RX ADMIN — Medication 2 MILLIGRAM(S): at 07:13

## 2018-10-25 RX ADMIN — HEPARIN SODIUM 5000 UNIT(S): 5000 INJECTION INTRAVENOUS; SUBCUTANEOUS at 13:34

## 2018-10-25 RX ADMIN — Medication 200 MILLIGRAM(S): at 07:13

## 2018-10-25 RX ADMIN — Medication 200 MILLIGRAM(S): at 18:00

## 2018-10-25 RX ADMIN — ESCITALOPRAM OXALATE 5 MILLIGRAM(S): 10 TABLET, FILM COATED ORAL at 12:07

## 2018-10-25 RX ADMIN — Medication 20 MILLIEQUIVALENT(S): at 09:08

## 2018-10-25 RX ADMIN — Medication 5 MILLIGRAM(S): at 21:35

## 2018-10-25 RX ADMIN — SODIUM CHLORIDE 70 MILLILITER(S): 9 INJECTION, SOLUTION INTRAVENOUS at 18:00

## 2018-10-25 RX ADMIN — AMLODIPINE BESYLATE 10 MILLIGRAM(S): 2.5 TABLET ORAL at 07:13

## 2018-10-25 RX ADMIN — Medication 1 DROP(S): at 18:07

## 2018-10-25 RX ADMIN — HEPARIN SODIUM 5000 UNIT(S): 5000 INJECTION INTRAVENOUS; SUBCUTANEOUS at 21:35

## 2018-10-25 RX ADMIN — HEPARIN SODIUM 5000 UNIT(S): 5000 INJECTION INTRAVENOUS; SUBCUTANEOUS at 07:13

## 2018-10-25 RX ADMIN — LATANOPROST 1 DROP(S): 0.05 SOLUTION/ DROPS OPHTHALMIC; TOPICAL at 21:35

## 2018-10-25 NOTE — PROGRESS NOTE ADULT - PROBLEM SELECTOR PLAN 3
Anion gap on admission of 17. Likely 2/2 starvation ketoacidosis as UA + for ketones and pt depressed and not eating.   -AG reopened today 10/23 again likely 2/2 starvation ketoacidosis as pt is not eating much  -serum B hydroxy butyrate is 1.9 supports diagnosis of starvation ketoacidosis  -lactate 1.3 wnl  -Psych consulted. will f/u recs.
Improving, cont IVF
Overnight 10/22 pt /112. All other VSS. On exam pt was noted to be lethargic and weak. Was responsive (made grunting noises) but not vocal about if he was in pain/would not provide answers to questions. s/p 5mg labetalol IVP. given change from prior mental status of AAOx3, rapid response was called.  Pt found to have rotary nystagmus. Continued to be weak, not following commands, and very agitated.  Stroke code was called.  Was given haldol 2mg IM; agitated improved and pt was minimally responsive to command. Labs notable for mild hyponatremia. Initial read of CT brain without evidence of hemorrhage of acute infarct. After CT scan pt was much improved: AAOx3, responsive, and following commands with no focal neurologic deficits. ICU consulted; rejected patient. Episode likely 2/2 hypertensive emergency, given improvement with 5mg IV labetalol and improvement of pressures to SBP 150s.  -Per neuro CT head shows mild subacute embolic CVA  -pt refused TERI and MRI head.   -c/w aspirin 81mg PO qd and plavix 75mg PO qd 10/22  -neuro following. will f/u recs  -no focal deficits observed on neuro exam
Presenting with cr 2.8. unknown baseline. reports no hx kidney dz. Unclear etiology. suspect prerenal as improved with IVF  -continuing to improve  -strict I/O  -avoid nephrotoxic agents  -trend BMP  -dc ringer's lactate in setting of improved renal function
-Na 130, stable. Unclear etiology. not improved with 2L NS  -f/u urine studies, TSH, serum osm  -trend BMP  -c/w D5NS at 80 for now
Overnight 10/22 pt /112. All other VSS. On exam pt was noted to be lethargic and weak. Was responsive (made grunting noises) but not vocal about if he was in pain/would not provide answers to questions. s/p 5mg labetalol IVP. given change from prior mental status of AAOx3, rapid response was called.  Pt found to have rotary nystagmus. Continued to be weak, not following commands, and very agitated.  Stroke code was called.  Was given haldol 2mg IM; agitated improved and pt was minimally responsive to command. Labs notable for mild hyponatremia. Initial read of CT brain without evidence of hemorrhage of acute infarct. After CT scan pt was much improved: AAOx3, responsive, and following commands with no focal neurologic deficits. ICU consulted; rejected patient. Episode likely 2/2 hypertensive emergency, given improvement with 5mg IV labetalol and improvement of pressures to SBP 150s.  -Per neuro CT head shows mild subacute embolic CVA  -pt refused TERI and MRI head.   -c/w aspirin 81mg PO qd and plavix 75mg PO qd 10/22  -neuro following. will f/u recs  -no focal deficits observed on neuro exam

## 2018-10-25 NOTE — PROGRESS NOTE BEHAVIORAL HEALTH - PRIMARY DX
Moderate episode of recurrent major depressive disorder
Moderate episode of recurrent major depressive disorder

## 2018-10-25 NOTE — DISCHARGE NOTE ADULT - MEDICATION SUMMARY - MEDICATIONS TO TAKE
I will START or STAY ON the medications listed below when I get home from the hospital:    aspirin 81 mg oral tablet, chewable  -- 1 tab(s) by mouth once a day  -- Indication: For CVA (cerebral vascular accident)    losartan 100 mg oral tablet  -- 1 tab(s) by mouth once a day  -- Indication: For Hypertension    escitalopram 5 mg oral tablet  -- 1 tab(s) by mouth once a day  -- Indication: For Depression    atorvastatin 80 mg oral tablet  -- 1 tab(s) by mouth once a day (at bedtime)  -- Indication: For CVA (cerebral vascular accident)    clopidogrel 75 mg oral tablet  -- 1 tab(s) by mouth once a day  -- Indication: For CVA (cerebral vascular accident)    labetalol 200 mg oral tablet  -- 1 tab(s) by mouth 2 times a day  -- Indication: For Hypertension    amLODIPine 10 mg oral tablet  -- 1 tab(s) by mouth once a day  -- Indication: For Hypertension    timolol hemihydrate 0.5% ophthalmic solution  -- 1 drop(s) to each affected eye 2 times a day  -- Indication: For glaucoma    latanoprost 0.005% ophthalmic solution  -- 1 drop(s) to each affected eye once a day (in the evening)  -- Indication: For glaucoma

## 2018-10-25 NOTE — DISCHARGE NOTE ADULT - HOSPITAL COURSE
78M, poor historian, with a PMHx HTN, depression and anxiety who was brought by EMS to [facility] after an episode at his optometrist's office in which he slouched over and was somnolent.f off of it. Denies hx sz. He says that he vaguely recalls the episode at his optometrist's office. He denies confusion, HA, focal weakness, numbness/tingling, dysphagia, hearing/vision change, slurred speech. Denies F/C, CP, SOB, cough, abd pain, N/V/D. He does suffer from mild constipation, last BM yesterday. Denies blood in stool. Believes he has lost weight but unsure how much. Denies night sweats. Last c scope 10 years ago reportedly normal. Never had an EGD. He reports progressive weakness and decreased PO intake due to lack of appetite x several weeks. He was seen three years ago with similar symptoms but left against medical advice before having an EGD. He denies substance use or etoh use. Otherwise ROS neg.    In the ED, T 97.6, HR 61, /63, RR 18, 97% on RA. Labs notable for WBC 14.5 (normal diff), K 2.2, bicarb 17, cr 2.8, PTT 37, INR 1.2, lactate 2.1, trop I 0.12, Utox (+) benzos. CTH showed small vessel dz. CXR clear. Received , K 10 meq x 3, K 40 meq PO x 2, 2L NS. Repeat BMP showed persistent hypokalemia. EKG showed SR with RBBB and prolonged QTc ~500. 78M, poor historian, with a PMHx HTN, depression and anxiety who was brought by EMS to [facility] after an episode at his optometrist's office in which he slouched over and was somnolent. In the ED, T 97.6, HR 61, /63, RR 18, 97% on RA. Labs notable for WBC 14.5 (normal diff), K 2.2, bicarb 17, cr 2.8, PTT 37, INR 1.2, lactate 2.1, trop I 0.12, Utox (+) benzos. CTH showed small vessel dz. CXR clear. Received , K 10 meq x 3, K 40 meq PO x 2, 2L NS. Repeat BMP showed persistent hypokalemia. EKG showed SR with RBBB and prolonged QTc ~500. Patient admitted to medicine for acute renal failure and toxic metabolic encephalopathy. Patient's acute renal failure resolved with Lactate ringer and repletion of electrolytes. Toxic metabolic encephalopathy likely 2/2 starvation metabolic ketoacidosis, dehydration, and depression. Patient had a rapid response when his /112. All other VSS. On exam pt was noted to be lethargic and weak. s/p 5mg labetalol IVP.  Pt found to have rotary nystagmus. Continued to be weak, not following commands, and very agitated.  Stroke code was called.  Was given haldol 2mg IM; agitated improved and pt was minimally responsive to commands. Unable to obtain EKG given agitation and restlessness. Labs drawn and pt was sent down for CT scan. Labs notable for mild hyponatremia. CT brain without evidence of hemorrhage of acute infarct. After CT scan pt was much improved: AAOx3, responsive, and following commands with no focal neurologic deficits. ICU was consulted; rejected patient and provided recommendations for primary team. Episode likely 2/2 hypertensive emergency, given improvement with 5mg IV labetalol and improvement of pressures to SBP 150s.  -Hypertensive emergency resolved. 78M, poor historian, with a PMHx HTN, depression and anxiety who was brought by EMS to [facility] after an episode at his optometrist's office in which he slouched over and was somnolent. In the ED, T 97.6, HR 61, /63, RR 18, 97% on RA. Labs notable for WBC 14.5 (normal diff), K 2.2, bicarb 17, cr 2.8, PTT 37, INR 1.2, lactate 2.1, trop I 0.12, Utox (+) benzos. CTH showed small vessel dz. CXR clear. Received , K 10 meq x 3, K 40 meq PO x 2, 2L NS. Repeat BMP showed persistent hypokalemia. EKG showed SR with RBBB and prolonged QTc ~500. Patient admitted to medicine for acute renal failure with a creatinine of 2.82 and toxic metabolic encephalopathy. Patient's acute renal failure resolved with Lactate ringer and repletion of electrolytes, was likely 2/2 dehydration. Toxic metabolic encephalopathy likely 2/2 starvation metabolic ketoacidosis, dehydration, and depression. Patient had a rapid response when his BP increased to 217/112. All other VSS. On exam pt was noted to be lethargic and weak. s/p 5mg labetalol IVP.  Pt found to have rotary nystagmus. Continued to be weak, not following commands, and very agitated.  Stroke code was called.  Was given haldol 2mg IM; agitated improved and pt was minimally responsive to commands. Unable to obtain EKG given agitation and restlessness. Labs drawn and pt was sent down for CT scan. Labs notable for mild hyponatremia. CT brain without evidence of hemorrhage of acute infarct. After CT scan pt was much improved: AAOx3, responsive, and following commands with no focal neurologic deficits. ICU was consulted; rejected patient and provided recommendations for primary team. Episode likely 2/2 hypertensive emergency, given improvement with 5mg IV labetalol and improvement of pressures to SBP 150s. Neurology followed patient and found that he likely had a mild subacute thrombotic CVA so was started on aspirin and plavix. Pt's blood pressure managed with labetalol 200mg PO bid, amlodipine 10mg PO qd, and losartan 100mg PO qd. He had depression in hospital so psych was consulted. Started on lexapro 5mg PO qd. Patient now hemodynamically stable and fit for discharge. 78M, poor historian, with a PMHx HTN, depression and anxiety who was brought by EMS to [facility] after an episode at his optometrist's office in which he slouched over and was somnolent. In the ED, T 97.6, HR 61, /63, RR 18, 97% on RA. Labs notable for WBC 14.5 (normal diff), K 2.2, bicarb 17, cr 2.8, PTT 37, INR 1.2, lactate 2.1, trop I 0.12, Utox (+) benzos. CTH showed small vessel dz. CXR clear. Received , K 10 meq x 3, K 40 meq PO x 2, 2L NS. Repeat BMP showed persistent hypokalemia. EKG showed SR with RBBB and prolonged QTc ~500. Patient admitted to medicine for acute renal failure with a creatinine of 2.82 and toxic metabolic encephalopathy. Patient's acute renal failure resolved with Lactate ringer and repletion of electrolytes, was likely 2/2 dehydration. Toxic metabolic encephalopathy likely 2/2 starvation metabolic ketoacidosis, dehydration, and depression. Pt had anion gap metabolic acidosis 2/2 starvation ketosis, resolved with fluids. Patient had a rapid response when his BP increased to 217/112. All other VSS. On exam pt was noted to be lethargic and weak. s/p 5mg labetalol IVP.  Pt found to have rotary nystagmus. Continued to be weak, not following commands, and very agitated.  Stroke code was called.  Was given haldol 2mg IM; agitated improved and pt was minimally responsive to commands. Unable to obtain EKG given agitation and restlessness. Labs drawn and pt was sent down for CT scan. Labs notable for mild hyponatremia. CT brain without evidence of hemorrhage of acute infarct. After CT scan pt was much improved: AAOx3, responsive, and following commands with no focal neurologic deficits. ICU was consulted; rejected patient and provided recommendations for primary team. Episode likely 2/2 hypertensive emergency, given improvement with 5mg IV labetalol and improvement of pressures to SBP 150s. Neurology followed patient and found that he likely had a mild subacute embolic CVA so was started on aspirin and plavix. Pt's blood pressure managed with labetalol 200mg PO bid, amlodipine 10mg PO qd, and losartan 100mg PO qd. He had depression in hospital so psych was consulted. Started on lexapro 5mg PO qd. Patient now hemodynamically stable and fit for discharge.

## 2018-10-25 NOTE — PROGRESS NOTE ADULT - SUBJECTIVE AND OBJECTIVE BOX
OVERNIGHT EVENTS: Had diarrhea overnight. Pt requested loperamide. Gave 2mg x1.    SUBJECTIVE / INTERVAL HPI: Patient seen and examined at bedside. Pt states he had diarrhea over night and would like more than one dose of loperamide. He is refusing to see a psychiatrist on discharge from the hospital. Rest of ROS negative.     VITAL SIGNS:  Vital Signs Last 24 Hrs  T(C): 36.7 (25 Oct 2018 08:51), Max: 37.1 (25 Oct 2018 05:02)  T(F): 98 (25 Oct 2018 08:51), Max: 98.7 (25 Oct 2018 05:02)  HR: 60 (25 Oct 2018 08:51) (60 - 63)  BP: 158/87 (25 Oct 2018 08:51) (138/75 - 158/87)  BP(mean): --  RR: 18 (25 Oct 2018 08:51) (18 - 20)  SpO2: 99% (25 Oct 2018 08:51) (96% - 99%)    PHYSICAL EXAM:    General: depressed man lying in bed  HEENT: NCAT; PERRL, anicteric sclera  Neck: supple, trachea midline  Cardiovascular: S1, S2 normal; RRR, no M/G/R  Respiratory: CTABL; no W/R/R  Gastrointestinal: soft, nontender, nondistended. bowel sounds present.  Skin: no ulcerations or visible rashes appreciated  Extremities: WWP; no edema, clubbing or cyanosis  Vascular: 2+ radial, DP/PT pulses B/L  Neurological: AAOx3; no focal deficits  Psych: constricted, flat affect, poor hygiene    MEDICATIONS:  MEDICATIONS  (STANDING):  amLODIPine   Tablet 10 milliGRAM(s) Oral daily  aspirin  chewable 81 milliGRAM(s) Oral daily  atorvastatin 80 milliGRAM(s) Oral at bedtime  clopidogrel Tablet 75 milliGRAM(s) Oral daily  dextrose 5% + sodium chloride 0.9%. 1000 milliLiter(s) (70 mL/Hr) IV Continuous <Continuous>  escitalopram 5 milliGRAM(s) Oral daily  heparin  Injectable 5000 Unit(s) SubCutaneous every 8 hours  labetalol 200 milliGRAM(s) Oral two times a day  latanoprost 0.005% Ophthalmic Solution 1 Drop(s) Both EYES at bedtime  losartan 100 milliGRAM(s) Oral daily  melatonin 5 milliGRAM(s) Oral at bedtime  timolol 0.25% Solution 1 Drop(s) Both EYES two times a day    MEDICATIONS  (PRN):  simethicone 80 milliGRAM(s) Chew four times a day PRN Gas      ALLERGIES:  Allergies    No Known Allergies    Intolerances        LABS:                        13.8   9.3   )-----------( 237      ( 25 Oct 2018 07:45 )             39.0     10-25    140  |  106  |  17  ----------------------------<  145<H>  3.8   |  17<L>  |  1.13    Ca    9.2      25 Oct 2018 07:45  Mg     1.8     10-25          CAPILLARY BLOOD GLUCOSE          RADIOLOGY & ADDITIONAL TESTS: Reviewed.

## 2018-10-25 NOTE — PROGRESS NOTE ADULT - PROBLEM SELECTOR PLAN 7
Patient with delayed responses. oriented x 3 but has difficulty remembering things such as medications, PMHx. Likely 2/2 combination of xanax use, dehydration, and depression.  -No e/o infection aside from leukocytosis (normal diff, downtrending).   -B12, TSH normal  -f/u RPR  -avoid sedating medications

## 2018-10-25 NOTE — DISCHARGE NOTE ADULT - SECONDARY DIAGNOSIS.
Depression Acute renal failure, unspecified acute renal failure type CVA (cerebral vascular accident)

## 2018-10-25 NOTE — PROGRESS NOTE BEHAVIORAL HEALTH - NS ED [facility] REVIEW OF ED CHART AVAILABLE LABS REVIEWED
Airway patent, Nasal mucosa clear. Mouth with normal mucosa. Throat has no vesicles, no oropharyngeal exudates and uvula is midline.
Yes
Yes

## 2018-10-25 NOTE — PROGRESS NOTE ADULT - PROBLEM SELECTOR PLAN 2
Around 2am 10/22 pt was found to have /112. All other VSS. On exam pt was noted to be lethargic and weak. s/p 5mg labetalol IVP. However given change from prior mental status of AAOx3, rapid response was called.  Pt found to have rotary nystagmus. Continued to be weak, not following commands, and very agitated.  Stroke code was called.  Was given haldol 2mg IM; agitated improved and pt was minimally responsive to commands. Unable to obtain EKG given agitation and restlessness. Labs drawn and pt was sent down for CT scan. Dr. Peters was present and the case was discussed with Dr. Mari. Labs notable for mild hyponatremia. CT brain without evidence of hemorrhage of acute infarct. After CT scan pt was much improved: AAOx3, responsive, and following commands with no focal neurologic deficits. ICU was consulted; rejected patient and provided recommendations for primary team. Episode likely 2/2 hypertensive emergency, given improvement with 5mg IV labetalol and improvement of pressures to SBP 150s.  -Hypertensive emergency resolved.     #HTN  -c/w labetalol 200mg PO bid  -c/w losartan 100mg PO qd  -start hydralazine 25mg PO q8h if sBP>160  -c/w amlodipine 10mg PO qd  -BP improved. continue to monitor.

## 2018-10-25 NOTE — PROGRESS NOTE ADULT - PROBLEM SELECTOR PLAN 4
Anion gap on admission of 17 2/2 starvation ketoacidosis as UA + for ketones and pt depressed and not eating.   -AG reopened 10/23 again likely 2/2 starvation ketoacidosis as pt is not eating much  -serum B hydroxy butyrate is 1.9 supports diagnosis of starvation ketoacidosis  -lactate 1.3 wnl  -AG closed today.   -c/w D5NS at 70cc/h as pt is not eating much.

## 2018-10-25 NOTE — PROGRESS NOTE ADULT - PROBLEM SELECTOR PROBLEM 4
Acute renal failure, unspecified acute renal failure type
Toxic metabolic encephalopathy
Hypokalemia
Metabolic acidosis, increased anion gap
Troponin level elevated
Metabolic acidosis, increased anion gap

## 2018-10-25 NOTE — PROGRESS NOTE ADULT - PROBLEM SELECTOR PROBLEM 3
Metabolic acidosis, increased anion gap
Hyponatremia
Acute renal failure, unspecified acute renal failure type
CVA (cerebral vascular accident)
Hyponatremia
CVA (cerebral vascular accident)

## 2018-10-25 NOTE — PROGRESS NOTE ADULT - ASSESSMENT
Pt is a 78M, poor historian, with a PMHx HTN, depression and anxiety with toxic metabolic encephalopathy, acute renal failure, hypokalemia and hyponatremia.

## 2018-10-25 NOTE — PROGRESS NOTE BEHAVIORAL HEALTH - NSBHCONSULTMEDS_PSY_A_CORE FT
Continue Lexapro 5 mg po qdaily
Continue Lexapro 5 mg po qdaily, with plan to increase as tolerated/warranted. (Pt has been on higher dose of 15 mg po qdaily several years ago.)

## 2018-10-25 NOTE — PROGRESS NOTE BEHAVIORAL HEALTH - OTHER
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 He recalls being admitted to [facility], though states he was admitted x 6 months.

## 2018-10-25 NOTE — PROGRESS NOTE BEHAVIORAL HEALTH - NSBHCHARTREVIEWLAB_PSY_A_CORE FT
13.8   9.3   )-----------( 237      ( 25 Oct 2018 07:45 )             39.0     10-25    140  |  106  |  17  ----------------------------<  145<H>  3.8   |  17<L>  |  1.13    Ca    9.2      25 Oct 2018 07:45  Mg     1.8     10-25
10-24    136  |  101  |  15  ----------------------------<  158<H>  3.5   |  20<L>  |  1.15    Ca    9.0      24 Oct 2018 06:50  Mg     2.0     10-24                          13.8   8.6   )-----------( 209      ( 24 Oct 2018 06:50 )             37.8

## 2018-10-25 NOTE — DISCHARGE NOTE ADULT - ADDITIONAL INSTRUCTIONS
Please go to your appointment with Dr. Reed on November 5 at 10am. Please go to your appointment with your primary care provider Dr. Reed on November 5 at 10am.

## 2018-10-25 NOTE — PROGRESS NOTE ADULT - PROBLEM SELECTOR PROBLEM 7
Hyponatremia
Nutrition, metabolism, and development symptoms
Depression, unspecified depression type
Toxic metabolic encephalopathy
Depression, unspecified depression type
Toxic metabolic encephalopathy

## 2018-10-25 NOTE — PROGRESS NOTE ADULT - PROBLEM SELECTOR PROBLEM 5
Hypokalemia
Essential hypertension
Acute renal failure, unspecified acute renal failure type
Toxic metabolic encephalopathy
Toxic metabolic encephalopathy
Acute renal failure, unspecified acute renal failure type

## 2018-10-25 NOTE — DISCHARGE NOTE ADULT - PLAN OF CARE
minimum assist (75% patients effort) Please go to your appointment with your primary care provider Dr. Reed on November 5 at 10am. You have a history of high blood pressure. In the hospital Please go to your psychiatry appointment on Tuesday, October 30th at 10am at [facility] at [facility]: [address] [phone] You have a history of high blood pressure. In the hospital your blood pressure was very high. You were started on new blood pressure medications that helped your blood pressure. Please continue to take amlodipine 10mg one tablet once a day, losartan (Cozaar) 100mg one tablet once a day, and labetalol 200mg one tablet two times a day. You have a history of depression You have a history of depression. In the hospital you were very depressed. It is important for you to take your lexapro 5mg one tablet once a day. You came into the hospital because you were very out of it at the optomitrist's office. You were found to have acute kidney failure. The kidney failure was likely due to not eating and drinking enough. It is very important that you take care of yourself. If you do not take care of yourself and eat and drink you could have kidney failure again. Please go to the psychiatrist appointment and your appointment with Dr. Reed. In the hospital you were found to have suffered a mild stroke. You have no residual effects from the stroke but it is important that you take your prescribed aspirin 81mg one tablet once a day, plavix 75mg one tablet once a day, and atorvastatin 80mg one tablet once a day.

## 2018-10-25 NOTE — PROGRESS NOTE BEHAVIORAL HEALTH - NSBHCHARTREVIEWVS_PSY_A_CORE FT
ICU Vital Signs Last 24 Hrs  T(C): 36.3 (25 Oct 2018 16:49), Max: 37.1 (25 Oct 2018 05:02)  T(F): 97.4 (25 Oct 2018 16:49), Max: 98.7 (25 Oct 2018 05:02)  HR: 63 (25 Oct 2018 16:49) (54 - 63)  BP: 123/74 (25 Oct 2018 16:49) (108/68 - 158/87)  BP(mean): --  ABP: --  ABP(mean): --  RR: 18 (25 Oct 2018 16:49) (18 - 20)  SpO2: 96% (25 Oct 2018 16:49) (96% - 99%)
ICU Vital Signs Last 24 Hrs  T(C): 36.6 (24 Oct 2018 21:29), Max: 36.9 (24 Oct 2018 15:38)  T(F): 97.8 (24 Oct 2018 21:29), Max: 98.5 (24 Oct 2018 15:38)  HR: 61 (24 Oct 2018 21:29) (60 - 63)  BP: 138/75 (24 Oct 2018 21:29) (138/75 - 186/77)  BP(mean): --  ABP: --  ABP(mean): --  RR: 20 (24 Oct 2018 21:29) (18 - 20)  SpO2: 96% (24 Oct 2018 21:29) (96% - 98%)

## 2018-10-25 NOTE — PROGRESS NOTE ADULT - PROBLEM SELECTOR PROBLEM 6
Toxic metabolic encephalopathy
Depression, unspecified depression type
Hypokalemia
Hyponatremia
Essential hypertension
Hypokalemia

## 2018-10-25 NOTE — PROGRESS NOTE BEHAVIORAL HEALTH - SUMMARY
78-year-old male with hx of depression and anxiety, benzo rx/dx of abuse, reported hx of alcohol abuse, 1 prior psychiatric admission almost 4 years ago, prior outpt tx/tx, now admitted with AMS s/p collapsing in optometrist's office. Pt found to have KHOA, hypokalemia, increased creatinine. Per team, pt's presenting ss/sx/lab abnormalities c/w poor po intake/malnutrition.  Pt endorsed depressed mood earlier, though denied any thoughts of self-harm or suicidality.  Pt declined offer for voluntary inpt psychiatric admit, and does not meet criteria for involuntary admit.  Pt declined outpt follow up at [facility] (outpt psychiatry clinic at [facility]). He states he will obtain referral from his PMD.
78-year-old male with hx of depression and anxiety, benzo rx/dx of abuse, reported hx of alcohol abuse, 1 prior psychiatric admission almost 4 years ago, prior outpt tx/tx, now admitted with AMS s/p collapsing in optometrist's office. Pt found to have KHOA, hypokalemia, increased creatinine. Per team, pt's presenting ss/sx/lab abnormalities c/w poor po intake/malnutrition.  Pt endorsed depressed mood earlier, though denied any thoughts of self-harm or suicidality.  Pt declined offer for voluntary inpt psychiatric admit, and does not meet criteria for involuntary admit.  Pt open to outpt follow up at [facility] (outpt psychiatry clinic at [facility].

## 2018-10-25 NOTE — PROGRESS NOTE BEHAVIORAL HEALTH - NSBHCONSULTRECOMMENDOTHER_PSY_A_CORE FT
Counselled pt regarding benefits of outpt psychiatric follow up. As stated above, pt is not interested in pursuing follow up at [facility] (outpt psychiatric clinic at [facility]). He states he will ask his PMD for referrals.

## 2018-10-25 NOTE — PROGRESS NOTE BEHAVIORAL HEALTH - NSBHADMITCOUNSEL_PSY_A_CORE
client/family/caregiver education/prognosis/diagnostic results/impressions and/or recommended studies/risks and benefits of treatment options/importance of adherence to chosen treatment/instructions for management, treatment and follow up/risk factor reduction
importance of adherence to chosen treatment

## 2018-10-25 NOTE — DISCHARGE NOTE ADULT - PROVIDER TOKENS
FREE:[LAST:[[place]],FIRST:[[place]],PHONE:[phone]:[phone]    -],ADDRESS:[[address] [place] [address]]],TOKEN:'[id number]'

## 2018-10-25 NOTE — PROGRESS NOTE BEHAVIORAL HEALTH - NSBHFUPINTERVALHXFT_PSY_A_CORE
Pt seen; chart reviewed; discussed with team.  Observed eating lunch Pt seen; chart reviewed; discussed with team.  Observed eating lunch. Reports he feels better overall, although his loose dentures are impeding his ability to eat.  Pt is more alert, more engageable, A+O x 4. He states that he is still interested in outpt psychiatric follow up, but is not Pt seen; chart reviewed; discussed with team.  Observed eating lunch. Reports he feels better overall, although his loose dentures are impeding his ability to eat.  Pt is more alert, more engageable, A+O x 4. He denies suicidal ideation/intent/plan.   He states that he is still interested in outpt psychiatric follow up, but does not want to attend [facility]. He states he will get referral from his PMD.  Pt confronted regarding prior hx of EtOH abuse, documented in material from [facility] [address] admit of Dec 2014-Jan 2015. Pt denied that this was an issue, denies current EtOH abuse.  Pt tolerating Lexapro 5 mg po qdaily; no evidence of SE.

## 2018-10-25 NOTE — PROGRESS NOTE ADULT - ATTENDING COMMENTS
Istop shows patient was prescribed a 30 day course of xanex back on 6/21 but no benzos since that time. Doing well today.  Cont SSRI and f/u with outpt psychiatrist.
Patient refused TERI. TTE with bubble performed. Pt refused MRI. Consult Psych for potential severe depression. Anorexia: Pt refusing to eat. F/U Psych recs. Starvation ketosis: Start D5NS.
Patient with a very flat affect and concern for possible underlying depression. Denies and SI/HI. Accepting to starting lexapro. F/U Psych recs.

## 2018-10-25 NOTE — DISCHARGE NOTE ADULT - CARE PROVIDER_API CALL
[place]  [address] [address]  Phone: [phone]  Fax: [phone]   )    -    Yuniel Villatoro), Geriatric Psychiatry; Psychiatry; Psychosomatic Medicine  [address] [address]  Phone: [phone]  Fax: [phone]

## 2018-10-25 NOTE — PROGRESS NOTE ADULT - PROBLEM SELECTOR PROBLEM 2
Hypokalemia
Hypertensive emergency
Hypertensive emergency
R/O CVA (cerebral vascular accident)
Hypokalemia
CVA (cerebral vascular accident)

## 2018-10-25 NOTE — PROGRESS NOTE ADULT - PROBLEM SELECTOR PROBLEM 1
Acute renal failure, unspecified acute renal failure type
Depression, unspecified depression type
Depression, unspecified depression type
Hypertensive emergency
Acute renal failure, unspecified acute renal failure type
Hypertensive emergency

## 2018-10-25 NOTE — DISCHARGE NOTE ADULT - CARE PLAN
Principal Discharge DX:	Essential hypertension  Goal:	Please go to your appointment with your primary care provider Dr. Reed on November 5 at 10am.  Assessment and plan of treatment:	You have a history of high blood pressure. In the hospital  Secondary Diagnosis:	Depression  Goal:	Please go to your psychiatry appointment on Tuesday, October 30th at 10am at [facility] at [facility]: [address], 4th Floor [phone]  Secondary Diagnosis:	Acute renal failure, unspecified acute renal failure type  Goal:	Please go to your appointment with your primary care provider Dr. Reed on November 5 at 10am.  Secondary Diagnosis:	CVA (cerebral vascular accident) Principal Discharge DX:	Essential hypertension  Goal:	Please go to your appointment with your primary care provider Dr. Reed on November 5 at 10am.  Assessment and plan of treatment:	You have a history of high blood pressure. In the hospital your blood pressure was very high. You were started on new blood pressure medications that helped your blood pressure. Please continue to take amlodipine 10mg one tablet once a day, losartan (Cozaar) 100mg one tablet once a day, and labetalol 200mg one tablet two times a day.  Secondary Diagnosis:	Depression  Goal:	Please go to your psychiatry appointment on Tuesday, October 30th at 10am at [facility] at [facility]: [address] [place] [address], 4th Floor [phone]  Secondary Diagnosis:	Acute renal failure, unspecified acute renal failure type  Goal:	Please go to your appointment with your primary care provider Dr. Reed on November 5 at 10am.  Secondary Diagnosis:	CVA (cerebral vascular accident) Principal Discharge DX:	Essential hypertension  Goal:	Please go to your appointment with your primary care provider Dr. Reed on November 5 at 10am.  Assessment and plan of treatment:	You have a history of high blood pressure. In the hospital your blood pressure was very high. You were started on new blood pressure medications that helped your blood pressure. Please continue to take amlodipine 10mg one tablet once a day, losartan (Cozaar) 100mg one tablet once a day, and labetalol 200mg one tablet two times a day.  Secondary Diagnosis:	Depression  Goal:	Please go to your psychiatry appointment on Tuesday, October 30th at 10am at [facility] at [facility]: [address] [place] [address], 4th Floor [phone]  Assessment and plan of treatment:	You have a history of depression  Secondary Diagnosis:	Acute renal failure, unspecified acute renal failure type  Goal:	Please go to your appointment with your primary care provider Dr. Reed on November 5 at 10am.  Secondary Diagnosis:	CVA (cerebral vascular accident) Principal Discharge DX:	Essential hypertension  Goal:	Please go to your appointment with your primary care provider Dr. Reed on November 5 at 10am.  Assessment and plan of treatment:	You have a history of high blood pressure. In the hospital your blood pressure was very high. You were started on new blood pressure medications that helped your blood pressure. Please continue to take amlodipine 10mg one tablet once a day, losartan (Cozaar) 100mg one tablet once a day, and labetalol 200mg one tablet two times a day.  Secondary Diagnosis:	Depression  Goal:	Please go to your psychiatry appointment on Tuesday, October 30th at 10am at [facility] at [facility]: [address] [place] [address], 4th Floor [phone]  Assessment and plan of treatment:	You have a history of depression. In the hospital you were very depressed. It is important for you to take your lexapro 5mg one tablet once a day.  Secondary Diagnosis:	Acute renal failure, unspecified acute renal failure type  Goal:	Please go to your appointment with your primary care provider Dr. Reed on November 5 at 10am.  Secondary Diagnosis:	CVA (cerebral vascular accident) Principal Discharge DX:	Essential hypertension  Goal:	Please go to your appointment with your primary care provider Dr. Reed on November 5 at 10am.  Assessment and plan of treatment:	You have a history of high blood pressure. In the hospital your blood pressure was very high. You were started on new blood pressure medications that helped your blood pressure. Please continue to take amlodipine 10mg one tablet once a day, losartan (Cozaar) 100mg one tablet once a day, and labetalol 200mg one tablet two times a day.  Secondary Diagnosis:	Depression  Goal:	Please go to your psychiatry appointment on Tuesday, October 30th at 10am at [facility] at [facility]: [address] [place] [address], 4th Floor [phone]  Assessment and plan of treatment:	You have a history of depression. In the hospital you were very depressed. It is important for you to take your lexapro 5mg one tablet once a day.  Secondary Diagnosis:	Acute renal failure, unspecified acute renal failure type  Goal:	Please go to your appointment with your primary care provider Dr. Reed on November 5 at 10am.  Assessment and plan of treatment:	You came into the hospital because you were very out of it at the optomitrist's office. You were found to have acute kidney failure. The kidney failure was likely due to not eating and drinking enough. It is very important that you take care of yourself. If you do not take care of yourself and eat and drink you could have kidney failure again. Please go to the psychiatrist appointment and your appointment with Dr. Reed.  Secondary Diagnosis:	CVA (cerebral vascular accident)  Assessment and plan of treatment:	In the hospital you were found to have suffered a mild stroke. You have no residual effects from the stroke but it is important that you take your prescribed aspirin 81mg one tablet once a day, plavix 75mg one tablet once a day, and atorvastatin 80mg one tablet once a day.

## 2018-10-25 NOTE — DISCHARGE NOTE ADULT - MEDICATION SUMMARY - MEDICATIONS TO STOP TAKING
I will STOP taking the medications listed below when I get home from the hospital:    traZODone 100 mg oral tablet  -- 1 tab(s) by mouth once a day (at bedtime)    losartan-hydrochlorothiazide 100mg-12.5mg oral tablet  -- 1 tab(s) by mouth once a day    acetaZOLAMIDE 500 mg oral capsule, extended release  -- 1 cap(s) by mouth 2 times a day

## 2018-10-25 NOTE — PROGRESS NOTE ADULT - PROBLEM SELECTOR PLAN 6
K 2.2 on admission. persistent hypokalemia. Likely 2/2 diarrhea.  pt initially stated he hasn't had BM in 2-3 days but on further questioning stated he has also had diarrhea. Received 110 meq prior to arrival  -K fluctuates but improved from admission.   -no EKG changes.  -trend BMP and Mg

## 2018-10-25 NOTE — PROGRESS NOTE ADULT - PROBLEM SELECTOR PLAN 1
History of depression. Previously on sertraline. Denies SI/HI  -Pt has very flat and constricted affect and states he is depressed. Not as constricted today.   -Psych consulted. Pt refusing voluntary transfer to [facility]. Pt not fit for involuntary transfer per Psych. Recs appreciated.   -c/w lexapro 5mg PO qd History of depression. Previously on sertraline. Denies SI/HI  -Pt has very flat and constricted affect and states he is depressed. Not as constricted today.   -Psych consulted: Examination of prior [facility] admits confirms that pt was admitted to [facility] from 12/28/14-1/15/15. His discharge diagnoses included Anxiety D/O NOS, Depressive D/O NOS, EtOH Abuse and Benzodiazepine Abuse. Pt was discharged on Lexapro 15 mg po qdaily, klonopin 1 mg po BID, trazodone 150 mg po qhs.  -Pt refusing voluntary transfer to 8 [facility]. Pt not fit for involuntary transfer per Psych. Psych recs appreciated.   -c/w lexapro 5mg PO qd

## 2018-10-25 NOTE — PROGRESS NOTE BEHAVIORAL HEALTH - NSBHADMITCOORDCAREOTHER_PSY_A_CORE FT
Case discussed with Ms. Hernandez of [facility], in anticipation of pt following up there next week. However, pt now declines intake appt.

## 2018-10-25 NOTE — PROGRESS NOTE ADULT - PROBLEM SELECTOR PLAN 9
1) PCP Contacted on Admission: NA- no PCP- will need [facility]   2) Date of Contact with PCP: NA  3) PCP Contacted at Discharge: NA  4) Summary of Handoff Given to PCP:   5) Post-Discharge Appointment Date and Location: 1) PCP Contacted on Admission: No. Dr. Reed  [phone]  2) Date of Contact with PCP: 10/25  3) PCP Contacted at Discharge: NA  4) Summary of Handoff Given to PCP:   5) Post-Discharge Appointment Date and Location:

## 2018-10-25 NOTE — PROGRESS NOTE ADULT - PROBLEM SELECTOR PLAN 5
Presenting with cr 2.8. unknown baseline. reports no hx kidney dz. 2/2 starvation and dehydration.  -Resolved.   -avoid nephrotoxic agents  -trend BMP

## 2018-10-25 NOTE — DISCHARGE NOTE ADULT - PATIENT PORTAL LINK FT
You can access the [facility] Patient Portal, offered by [facility], by registering with the following website: [email or web address]

## 2018-10-26 VITALS
OXYGEN SATURATION: 97 % | DIASTOLIC BLOOD PRESSURE: 63 MMHG | SYSTOLIC BLOOD PRESSURE: 143 MMHG | RESPIRATION RATE: 16 BRPM | TEMPERATURE: 98 F | HEART RATE: 60 BPM

## 2018-10-26 LAB
ANION GAP SERPL CALC-SCNC: 14 MMOL/L — SIGNIFICANT CHANGE UP (ref 5–17)
BUN SERPL-MCNC: 15 MG/DL — SIGNIFICANT CHANGE UP (ref 7–23)
CALCIUM SERPL-MCNC: 9.1 MG/DL — SIGNIFICANT CHANGE UP (ref 8.4–10.5)
CHLORIDE SERPL-SCNC: 107 MMOL/L — SIGNIFICANT CHANGE UP (ref 96–108)
CO2 SERPL-SCNC: 19 MMOL/L — LOW (ref 22–31)
CREAT SERPL-MCNC: 1.21 MG/DL — SIGNIFICANT CHANGE UP (ref 0.5–1.3)
CULTURE RESULTS: SIGNIFICANT CHANGE UP
GLUCOSE SERPL-MCNC: 131 MG/DL — HIGH (ref 70–99)
HCT VFR BLD CALC: 37.9 % — LOW (ref 39–50)
HGB BLD-MCNC: 13.4 G/DL — SIGNIFICANT CHANGE UP (ref 13–17)
MAGNESIUM SERPL-MCNC: 2 MG/DL — SIGNIFICANT CHANGE UP (ref 1.6–2.6)
MCHC RBC-ENTMCNC: 31 PG — SIGNIFICANT CHANGE UP (ref 27–34)
MCHC RBC-ENTMCNC: 35.4 G/DL — SIGNIFICANT CHANGE UP (ref 32–36)
MCV RBC AUTO: 87.7 FL — SIGNIFICANT CHANGE UP (ref 80–100)
PLATELET # BLD AUTO: 246 K/UL — SIGNIFICANT CHANGE UP (ref 150–400)
POTASSIUM SERPL-MCNC: 4 MMOL/L — SIGNIFICANT CHANGE UP (ref 3.5–5.3)
POTASSIUM SERPL-SCNC: 4 MMOL/L — SIGNIFICANT CHANGE UP (ref 3.5–5.3)
RBC # BLD: 4.32 M/UL — SIGNIFICANT CHANGE UP (ref 4.2–5.8)
RBC # FLD: 12.8 % — SIGNIFICANT CHANGE UP (ref 10.3–16.9)
SODIUM SERPL-SCNC: 140 MMOL/L — SIGNIFICANT CHANGE UP (ref 135–145)
SPECIMEN SOURCE: SIGNIFICANT CHANGE UP
WBC # BLD: 7.6 K/UL — SIGNIFICANT CHANGE UP (ref 3.8–10.5)
WBC # FLD AUTO: 7.6 K/UL — SIGNIFICANT CHANGE UP (ref 3.8–10.5)

## 2018-10-26 PROCEDURE — 82436 ASSAY OF URINE CHLORIDE: CPT

## 2018-10-26 PROCEDURE — 70498 CT ANGIOGRAPHY NECK: CPT

## 2018-10-26 PROCEDURE — 96365 THER/PROPH/DIAG IV INF INIT: CPT

## 2018-10-26 PROCEDURE — 82962 GLUCOSE BLOOD TEST: CPT

## 2018-10-26 PROCEDURE — 99285 EMERGENCY DEPT VISIT HI MDM: CPT | Mod: 25

## 2018-10-26 PROCEDURE — 85025 COMPLETE CBC W/AUTO DIFF WBC: CPT

## 2018-10-26 PROCEDURE — 83605 ASSAY OF LACTIC ACID: CPT

## 2018-10-26 PROCEDURE — 97530 THERAPEUTIC ACTIVITIES: CPT

## 2018-10-26 PROCEDURE — 85730 THROMBOPLASTIN TIME PARTIAL: CPT

## 2018-10-26 PROCEDURE — 80053 COMPREHEN METABOLIC PANEL: CPT

## 2018-10-26 PROCEDURE — 82550 ASSAY OF CK (CPK): CPT

## 2018-10-26 PROCEDURE — 80307 DRUG TEST PRSMV CHEM ANLYZR: CPT

## 2018-10-26 PROCEDURE — 87086 URINE CULTURE/COLONY COUNT: CPT

## 2018-10-26 PROCEDURE — 84484 ASSAY OF TROPONIN QUANT: CPT

## 2018-10-26 PROCEDURE — 82010 KETONE BODYS QUAN: CPT

## 2018-10-26 PROCEDURE — 86901 BLOOD TYPING SEROLOGIC RH(D): CPT

## 2018-10-26 PROCEDURE — 36415 COLL VENOUS BLD VENIPUNCTURE: CPT

## 2018-10-26 PROCEDURE — 83036 HEMOGLOBIN GLYCOSYLATED A1C: CPT

## 2018-10-26 PROCEDURE — 82553 CREATINE MB FRACTION: CPT

## 2018-10-26 PROCEDURE — 97116 GAIT TRAINING THERAPY: CPT

## 2018-10-26 PROCEDURE — 71046 X-RAY EXAM CHEST 2 VIEWS: CPT

## 2018-10-26 PROCEDURE — 0042T: CPT

## 2018-10-26 PROCEDURE — 70496 CT ANGIOGRAPHY HEAD: CPT

## 2018-10-26 PROCEDURE — 81001 URINALYSIS AUTO W/SCOPE: CPT

## 2018-10-26 PROCEDURE — 96366 THER/PROPH/DIAG IV INF ADDON: CPT

## 2018-10-26 PROCEDURE — 84436 ASSAY OF TOTAL THYROXINE: CPT

## 2018-10-26 PROCEDURE — 93307 TTE W/O DOPPLER COMPLETE: CPT

## 2018-10-26 PROCEDURE — 84300 ASSAY OF URINE SODIUM: CPT

## 2018-10-26 PROCEDURE — 86850 RBC ANTIBODY SCREEN: CPT

## 2018-10-26 PROCEDURE — 87150 DNA/RNA AMPLIFIED PROBE: CPT

## 2018-10-26 PROCEDURE — 80061 LIPID PANEL: CPT

## 2018-10-26 PROCEDURE — 84133 ASSAY OF URINE POTASSIUM: CPT

## 2018-10-26 PROCEDURE — 83935 ASSAY OF URINE OSMOLALITY: CPT

## 2018-10-26 PROCEDURE — 86780 TREPONEMA PALLIDUM: CPT

## 2018-10-26 PROCEDURE — 93005 ELECTROCARDIOGRAM TRACING: CPT | Mod: 76

## 2018-10-26 PROCEDURE — 87040 BLOOD CULTURE FOR BACTERIA: CPT

## 2018-10-26 PROCEDURE — 84540 ASSAY OF URINE/UREA-N: CPT

## 2018-10-26 PROCEDURE — 70450 CT HEAD/BRAIN W/O DYE: CPT

## 2018-10-26 PROCEDURE — 85610 PROTHROMBIN TIME: CPT

## 2018-10-26 PROCEDURE — 84100 ASSAY OF PHOSPHORUS: CPT

## 2018-10-26 PROCEDURE — 83735 ASSAY OF MAGNESIUM: CPT

## 2018-10-26 PROCEDURE — 97161 PT EVAL LOW COMPLEX 20 MIN: CPT

## 2018-10-26 PROCEDURE — 99239 HOSP IP/OBS DSCHRG MGMT >30: CPT

## 2018-10-26 PROCEDURE — 82570 ASSAY OF URINE CREATININE: CPT

## 2018-10-26 PROCEDURE — 87507 IADNA-DNA/RNA PROBE TQ 12-25: CPT

## 2018-10-26 PROCEDURE — 86900 BLOOD TYPING SEROLOGIC ABO: CPT

## 2018-10-26 PROCEDURE — 85027 COMPLETE CBC AUTOMATED: CPT

## 2018-10-26 PROCEDURE — 82607 VITAMIN B-12: CPT

## 2018-10-26 PROCEDURE — 80048 BASIC METABOLIC PNL TOTAL CA: CPT

## 2018-10-26 PROCEDURE — 84443 ASSAY THYROID STIM HORMONE: CPT

## 2018-10-26 PROCEDURE — 82803 BLOOD GASES ANY COMBINATION: CPT

## 2018-10-26 PROCEDURE — 83930 ASSAY OF BLOOD OSMOLALITY: CPT

## 2018-10-26 RX ORDER — LATANOPROST 0.05 MG/ML
1 SOLUTION/ DROPS OPHTHALMIC; TOPICAL
Qty: 0 | Refills: 0 | COMMUNITY

## 2018-10-26 RX ORDER — TIMOLOL 0.5 %
1 DROPS OPHTHALMIC (EYE)
Qty: 0 | Refills: 0 | COMMUNITY

## 2018-10-26 RX ORDER — ACETAZOLAMIDE 250 MG/1
1 TABLET ORAL
Qty: 0 | Refills: 0 | COMMUNITY

## 2018-10-26 RX ORDER — TRAZODONE HCL 50 MG
1 TABLET ORAL
Qty: 0 | Refills: 0 | COMMUNITY

## 2018-10-26 RX ORDER — LOSARTAN/HYDROCHLOROTHIAZIDE 100MG-25MG
1 TABLET ORAL
Qty: 0 | Refills: 0 | COMMUNITY

## 2018-10-26 RX ADMIN — CLOPIDOGREL BISULFATE 75 MILLIGRAM(S): 75 TABLET, FILM COATED ORAL at 11:43

## 2018-10-26 RX ADMIN — ESCITALOPRAM OXALATE 5 MILLIGRAM(S): 10 TABLET, FILM COATED ORAL at 11:43

## 2018-10-26 RX ADMIN — SODIUM CHLORIDE 70 MILLILITER(S): 9 INJECTION, SOLUTION INTRAVENOUS at 06:20

## 2018-10-26 RX ADMIN — HEPARIN SODIUM 5000 UNIT(S): 5000 INJECTION INTRAVENOUS; SUBCUTANEOUS at 13:44

## 2018-10-26 RX ADMIN — Medication 200 MILLIGRAM(S): at 06:15

## 2018-10-26 RX ADMIN — Medication 1 DROP(S): at 06:15

## 2018-10-26 RX ADMIN — HEPARIN SODIUM 5000 UNIT(S): 5000 INJECTION INTRAVENOUS; SUBCUTANEOUS at 06:15

## 2018-10-26 RX ADMIN — Medication 81 MILLIGRAM(S): at 11:43

## 2018-10-26 RX ADMIN — LOSARTAN POTASSIUM 100 MILLIGRAM(S): 100 TABLET, FILM COATED ORAL at 06:15

## 2018-10-26 RX ADMIN — AMLODIPINE BESYLATE 10 MILLIGRAM(S): 2.5 TABLET ORAL at 06:15

## 2018-10-26 NOTE — PROGRESS NOTE ADULT - SUBJECTIVE AND OBJECTIVE BOX
Neurology Stroke Progress Note    INTERVAL HPI/OVERNIGHT EVENTS:  Patient seen and examined.   No acute complaints.  He feels much better using the Ativan.  He refused all imaging including MRI And TERI    MEDICATIONS  (STANDING):  amLODIPine   Tablet 10 milliGRAM(s) Oral daily  aspirin  chewable 81 milliGRAM(s) Oral daily  atorvastatin 80 milliGRAM(s) Oral at bedtime  clopidogrel Tablet 75 milliGRAM(s) Oral daily  dextrose 5% + sodium chloride 0.9%. 1000 milliLiter(s) (70 mL/Hr) IV Continuous <Continuous>  escitalopram 5 milliGRAM(s) Oral daily  heparin  Injectable 5000 Unit(s) SubCutaneous every 8 hours  labetalol 200 milliGRAM(s) Oral two times a day  latanoprost 0.005% Ophthalmic Solution 1 Drop(s) Both EYES at bedtime  losartan 100 milliGRAM(s) Oral daily  melatonin 5 milliGRAM(s) Oral at bedtime  timolol 0.25% Solution 1 Drop(s) Both EYES two times a day    MEDICATIONS  (PRN):  simethicone 80 milliGRAM(s) Chew four times a day PRN Gas    Allergies  No Known Allergies    ROS: As per HPI, otherwise negative    Vital Signs Last 24 Hrs  T(C): 36.9 (26 Oct 2018 09:00), Max: 36.9 (26 Oct 2018 09:00)  T(F): 98.5 (26 Oct 2018 09:00), Max: 98.5 (26 Oct 2018 09:00)  HR: 60 (26 Oct 2018 09:00) (53 - 63)  BP: 143/63 (26 Oct 2018 09:00) (108/68 - 159/76)  RR: 16 (26 Oct 2018 09:00) (16 - 18)  SpO2: 97% (26 Oct 2018 09:00) (95% - 97%)    Physical exam:  General: Sitting up in bed, NAD  Neurologic:  Mental status: awake, alert, oriented to person, hospital, able name, repeat, follows simple commands, asked semi-appropriate questions about his Ativan and Dr. Vyas  Cranial nerves: EOMI, no facial droop, tongue midline  Motor: left pronator drift with at least 4+/5 throughout  Sensation: intact light touch bilaterally  Coordination: uncooperative  Reflexes: 2+ in upper and lower extremities, downgoing toes bilaterally  Gait: deferred    LABS:                        13.4   7.6   )-----------( 246      ( 26 Oct 2018 06:33 )             37.9   10-26    140  |  107  |  15  ----------------------------<  131<H>  4.0   |  19<L>  |  1.21    Ca    9.1      26 Oct 2018 06:32  Mg     2.0     10-26    RADIOLOGY & ADDITIONAL TESTS:  no new cerebral imaging    Assessment and Plan  78y yo Male poor historian, with a PMHx HTN, depression and anxiety with toxic metabolic encephalopathy, acute renal failure, hypokalemia and hyponatremia, now with left sided weakness secondary to subacute right MCA stroke seen on CT.  Stable today now that on Lexapro and Ativan    1)Secondary stroke prevention  - Aspirin 81mg and plavix 75mg for antiplatelet  -Atorvastatin 80mg for statin    2) Further workup - patient refused all testing    3) DVT prophylaxis -   -Heparin SQ TID and SCDs    4) Psych - depression and anxiety -   - continue Lexapro 5mg  - Dr. Vyas had suggested trial of Ativan 0.25mg for his anxiety but will defer to primary team    Rest of plan as per primary team

## 2018-10-31 DIAGNOSIS — E86.0 DEHYDRATION: ICD-10-CM

## 2018-10-31 DIAGNOSIS — F33.1 MAJOR DEPRESSIVE DISORDER, RECURRENT, MODERATE: ICD-10-CM

## 2018-10-31 DIAGNOSIS — N17.9 ACUTE KIDNEY FAILURE, UNSPECIFIED: ICD-10-CM

## 2018-10-31 DIAGNOSIS — E87.1 HYPO-OSMOLALITY AND HYPONATREMIA: ICD-10-CM

## 2018-10-31 DIAGNOSIS — E87.6 HYPOKALEMIA: ICD-10-CM

## 2018-10-31 DIAGNOSIS — I10 ESSENTIAL (PRIMARY) HYPERTENSION: ICD-10-CM

## 2018-10-31 DIAGNOSIS — F13.10 SEDATIVE, HYPNOTIC OR ANXIOLYTIC ABUSE, UNCOMPLICATED: ICD-10-CM

## 2018-10-31 DIAGNOSIS — R63.4 ABNORMAL WEIGHT LOSS: ICD-10-CM

## 2018-10-31 DIAGNOSIS — E87.2 ACIDOSIS: ICD-10-CM

## 2018-10-31 DIAGNOSIS — I63.9 CEREBRAL INFARCTION, UNSPECIFIED: ICD-10-CM

## 2018-10-31 DIAGNOSIS — R19.7 DIARRHEA, UNSPECIFIED: ICD-10-CM

## 2018-10-31 DIAGNOSIS — F41.9 ANXIETY DISORDER, UNSPECIFIED: ICD-10-CM

## 2018-10-31 DIAGNOSIS — G92 TOXIC ENCEPHALOPATHY: ICD-10-CM

## 2018-10-31 DIAGNOSIS — I16.1 HYPERTENSIVE EMERGENCY: ICD-10-CM

## 2018-10-31 DIAGNOSIS — H55.09 OTHER FORMS OF NYSTAGMUS: ICD-10-CM

## 2019-09-09 NOTE — PROGRESS NOTE ADULT - REASON FOR ADMISSION
Problem: Adult Inpatient Plan of Care  Goal: Plan of Care Review  Outcome: Ongoing (interventions implemented as appropriate)  Tolerated infusion well.  No reactions noted.  Instructed to RTC this Wednesday @ 10:30a for pump d/c, verbalized understanding.  No questions or concerns at this time.  Ambulated off unit in NAD.      
renal failure

## 2019-10-12 NOTE — PATIENT PROFILE ADULT - DOES PATIENT HAVE ADVANCE DIRECTIVE
no
14.2   15.65 )-----------( 243      ( 12 Oct 2019 00:56 )             45.1     10-12    142  |  105  |  19  ----------------------------<  154<H>  3.8   |  24  |  0.81    Ca    9.5      12 Oct 2019 00:56    TPro  6.5  /  Alb  4.0  /  TBili  0.6  /  DBili  x   /  AST  18  /  ALT  15  /  AlkPhos  96  10-12      CT head:    IMPRESSION:     No acute intracranial bleeding, mass effect, or shift.  Mild right posterior parietal scalp hematoma.  Likely calcified meningioma in the right posterior fossa may be further   characterized with contrast-enhanced MRI.

## 2020-11-02 NOTE — PROGRESS NOTE ADULT - PROBLEM SELECTOR PROBLEM 8
LOV 8/12/2020    LAST LAB    LAST RX 8-12-20 8tabs 3 refills    Next OV   Future Appointments   Date Time Provider Peter Kim   2/3/2021  5:20 PM Arabella Caba MD [facility]         PROTOCOL  Rizatriptan Benzoate 10 MG Oral Tab          Si Nutrition, metabolism, and development symptoms

## 2020-11-04 NOTE — ED PROCEDURE NOTE - CPROC ED INFORMED CONSENT1
Benefits, risks, and possible complications of procedure explained to patient/caregiver who verbalized understanding and gave verbal consent.
Benefits, risks, and possible complications of procedure explained to patient/caregiver who verbalized understanding and gave verbal consent.
(2) more than 100 beats/min
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
## 2023-01-03 NOTE — PROGRESS NOTE ADULT - PROBLEM SELECTOR PLAN 8
Group Topic: BH Therapeutic Activity    Date: 1/2/2023  Start Time: 1930  End Time: 2000  Facilitators: MER Schaefer    Focus: Relaxation Meditation  Number in attendance: 9    Method: Group and Audio/Visual  Attendance: Present  Participation: Active  Patient Response: Appropriate feedback  Mood: Normal  Affect: Type: Euthymic (normal mood)   Range: Blunted/flat   Congruency: Congruent   Stability: Stable  Behavior/Socialization: Appropriate to group and Cooperative  Thought Process: Focused  Task Performance: Follows directions  Patient Evaluation: Independent - full participation       -Denies antidepressant use. Previously on sertraline. Denies SI/HI  -Psych consult today for capacity and depression workup. Pt has very flat affect.

## 2023-03-15 NOTE — ED PROVIDER NOTE - CPE EDP SKIN NORM
normal... Abbe Flap (Lower To Upper Lip) Text: The defect of the upper lip was assessed and measured.  Given the location and size of the defect, an Abbe flap was deemed most appropriate. Using a sterile surgical marker, an appropriate Abbe flap was measured and drawn on the lower lip. Local anesthesia was then infiltrated. A scalpel was then used to incise the upper lip through and through the skin, vermilion, muscle and mucosa, leaving the flap pedicled on the opposite side.  The flap was then rotated and transferred to the lower lip defect.  The flap was then sutured into place with a three layer technique, closing the orbicularis oris muscle layer with subcutaneous buried sutures, followed by a mucosal layer and an epidermal layer.